# Patient Record
Sex: MALE | Race: WHITE | NOT HISPANIC OR LATINO | Employment: FULL TIME | URBAN - NONMETROPOLITAN AREA
[De-identification: names, ages, dates, MRNs, and addresses within clinical notes are randomized per-mention and may not be internally consistent; named-entity substitution may affect disease eponyms.]

---

## 2017-01-18 ENCOUNTER — OFFICE VISIT (OUTPATIENT)
Dept: FAMILY MEDICINE CLINIC | Facility: CLINIC | Age: 42
End: 2017-01-18

## 2017-01-18 VITALS
OXYGEN SATURATION: 98 % | SYSTOLIC BLOOD PRESSURE: 124 MMHG | HEIGHT: 76 IN | HEART RATE: 103 BPM | WEIGHT: 246.5 LBS | DIASTOLIC BLOOD PRESSURE: 84 MMHG | TEMPERATURE: 97.3 F | BODY MASS INDEX: 30.02 KG/M2

## 2017-01-18 DIAGNOSIS — I10 ESSENTIAL HYPERTENSION: ICD-10-CM

## 2017-01-18 DIAGNOSIS — F90.9 ATTENTION DEFICIT HYPERACTIVITY DISORDER (ADHD), UNSPECIFIED ADHD TYPE: Primary | ICD-10-CM

## 2017-01-18 PROCEDURE — 99203 OFFICE O/P NEW LOW 30 MIN: CPT | Performed by: FAMILY MEDICINE

## 2017-01-18 RX ORDER — DEXTROAMPHETAMINE SACCHARATE, AMPHETAMINE ASPARTATE, DEXTROAMPHETAMINE SULFATE AND AMPHETAMINE SULFATE 5; 5; 5; 5 MG/1; MG/1; MG/1; MG/1
TABLET ORAL
Refills: 0 | COMMUNITY
Start: 2016-11-10 | End: 2017-01-18

## 2017-01-18 RX ORDER — LISINOPRIL AND HYDROCHLOROTHIAZIDE 25; 20 MG/1; MG/1
1 TABLET ORAL DAILY
Qty: 30 TABLET | Refills: 11 | Status: SHIPPED | OUTPATIENT
Start: 2017-01-18 | End: 2017-12-08 | Stop reason: SDUPTHER

## 2017-01-18 RX ORDER — DEXTROAMPHETAMINE SACCHARATE, AMPHETAMINE ASPARTATE, DEXTROAMPHETAMINE SULFATE AND AMPHETAMINE SULFATE 2.5; 2.5; 2.5; 2.5 MG/1; MG/1; MG/1; MG/1
TABLET ORAL
Qty: 60 TABLET | Refills: 0 | Status: SHIPPED | OUTPATIENT
Start: 2017-01-18 | End: 2019-11-26

## 2017-01-18 RX ORDER — LISINOPRIL AND HYDROCHLOROTHIAZIDE 25; 20 MG/1; MG/1
1 TABLET ORAL DAILY
COMMUNITY
End: 2017-01-18 | Stop reason: SDUPTHER

## 2017-01-18 NOTE — PROGRESS NOTES
Subjective   Chief Complaint   Patient presents with   • Establish Care       Alex Miranda is a 41 y.o. male who presents for Establish Care     New to [facility] info:  Previous PCP: doctor in [place]  Patient Care Team:  Emma Esqueda DO as PCP - General (Family Medicine)    Hypertension   This is a chronic problem. The current episode started more than 1 year ago. The problem is controlled. Pertinent negatives include no chest pain, headaches, palpitations or shortness of breath. The current treatment provides significant improvement. There are no compliance problems.      ADHD:  Reports the following symptoms of ADHD: has trouble wrapping up final details of a project, has difficulty getting things in order when there is a task that requires organization, has problems remembering appointments or obligations, avoids getting started with tasks that require a lot of thought, fidgets or squirms with hands or feet when having to sit down for a long time, feels overly active and compelled to do things, makes careless mistakes when having to work on a boring or difficult project, has difficulty staying attentive when doing boring or repetitive work, has difficulty concentrating on what people say even when they are speaking to them directly, misplaces or has difficulty finding things at work or at home, easily distracted by activity or noise around them, leaves their seat in meetings or other situations in which they are expected to remain seated, often feels restless or fidgety, has difficulty unwinding and relaxing , feels that they talk too much in social situations, finishes other peoples' sentences before they can finish them themselves, has difficulty waiting their turn  and often interrupts others when they are busy.  He has been out of medication for about a month because he used to see a doctor in [place] but is no longer seeing them and his Rx .  Was previously on 20 mg bid but hasn't  been on anything for a month.  Being off medication is affecting his job, he is having a hard time focusing at work.    ASRS Symptom checklist score: Part A (inattentive)- 22  Part B (hyperactive) - 44  Age at diagnosis: adolescence  Diagnosis made by: previous physician  Date of last formal assessment: within last 6 months    Current ADHD Meds: Not on anything x 1 month    Adverse side effects noted: none  Patient reports: worsening    Coexisting conditions: none    The following portions of the patient's history were reviewed and updated as appropriate:    Past Medical History   Diagnosis Date   • ADHD (attention deficit hyperactivity disorder)    • Hypertension        Past Surgical History   Procedure Laterality Date   • Amputation         History reviewed. No pertinent family history.    Social History     Social History   • Marital status:      Spouse name: N/A   • Number of children: N/A   • Years of education: N/A     Occupational History   • Not on file.     Social History Main Topics   • Smoking status: Current Every Day Smoker     Packs/day: 1.00   • Smokeless tobacco: Former User   • Alcohol use Yes   • Drug use: Not on file   • Sexual activity: Not on file     Other Topics Concern   • Not on file     Social History Narrative   • No narrative on file       Medications:    Current Outpatient Prescriptions:   •  amphetamine-dextroamphetamine (ADDERALL) 20 MG tablet, TK 1 T PO BID, Disp: , Rfl: 0  •  lisinopril-hydrochlorothiazide (PRINZIDE,ZESTORETIC) 20-25 MG per tablet, Take 1 tablet by mouth Daily., Disp: , Rfl:     Allergies   Allergen Reactions   • Penicillins      Rash       Review of Systems   Constitutional: Negative for fatigue and fever.   Respiratory: Negative for chest tightness and shortness of breath.    Cardiovascular: Negative for chest pain and palpitations.   Gastrointestinal: Negative for nausea and vomiting.   Neurological: Negative for headaches.   Psychiatric/Behavioral:  "Positive for decreased concentration. Negative for agitation, behavioral problems and sleep disturbance. The patient is not nervous/anxious.        Objective   Visit Vitals   • /84   • Pulse 103   • Temp 97.3 °F (36.3 °C)   • Ht 76\" (193 cm)   • Wt 246 lb 8 oz (112 kg)   • SpO2 98%   • BMI 30 kg/m2       Physical Exam   Constitutional: He appears well-developed and well-nourished. No distress.   Cardiovascular: Normal rate, regular rhythm and normal heart sounds.  Exam reveals no gallop and no friction rub.    No murmur heard.  Pulmonary/Chest: Effort normal and breath sounds normal. He has no wheezes. He has no rales.   Neurological: He is alert.   Skin: Skin is warm and dry. He is not diaphoretic.   Psychiatric: He has a normal mood and affect. His behavior is normal.   Nursing note and vitals reviewed.      Assessment/Plan   Alex Miranda is a 41 y.o. male seen today for the followin. Attention deficit hyperactivity disorder (ADHD), unspecified ADHD type  Will restart Adderall at 10 mg daily x 7 days then up to 10 mg bid as he has been off it for a month and essentially starting over. Follow up 1 month to see how he is tolerating it and titrate up as necessary.   We discussed risks of smoking while taking adderall and also possible side effect on blood pressure. He is aware. ALFA reviewed and was appropriate. No evidence of misuse or diversion. Report scanned into chart. Controlled substance contract discussed with patient and signed by myself and patient. Patient is in agreement with treatment plan and is aware that refills will not be done outside of appointments. Patient is aware of potential for addiction and dependence. Contract scanned into chart and copy given to patient. UDS today.     - amphetamine-dextroamphetamine (ADDERALL) 10 MG tablet; Take 1 tab po daily x 7 days then 1 tab po bid  Dispense: 60 tablet; Refill: 0  - TSH  - T4, Free  - Pain Management Profile (13 Drugs) " Urine    2. Essential hypertension  Controlled. Continue current medication. Med refilled.    - Comprehensive Metabolic Panel  - Lipid Panel  - TSH  - T4, Free  - lisinopril-hydrochlorothiazide (PRINZIDE,ZESTORETIC) 20-25 MG per tablet; Take 1 tablet by mouth Daily.  Dispense: 30 tablet; Refill: 11    Follow up: Return in about 4 weeks (around 2/15/2017) for Recheck.          This document has been electronically signed by Emma Esqueda DO on January 18, 2017 10:22 PM

## 2017-01-18 NOTE — MR AVS SNAPSHOT
Alex Miranda   1/18/2017 8:30 AM   Office Visit    Dept Phone:  [phone]   Encounter #:  [id number]    Provider:  Emma Esqueda DO   Department:  [facility]                Your Full Care Plan              Today's Medication Changes          These changes are accurate as of: 1/18/17  9:04 AM.  If you have any questions, ask your nurse or doctor.               New Medication(s)Ordered:     amphetamine-dextroamphetamine 10 MG tablet   Commonly known as:  ADDERALL   Take 1 tab po daily x 7 days then 1 tab po bid   Replaces:  amphetamine-dextroamphetamine 20 MG tablet   Started by:  Emma Esqueda DO         Stop taking medication(s)listed here:     amphetamine-dextroamphetamine 20 MG tablet   Commonly known as:  ADDERALL   Replaced by:  amphetamine-dextroamphetamine 10 MG tablet   Stopped by:  Emma Esqueda DO                Where to Get Your Medications      These medications were sent to [facility] [address] [place] [address] [phone] [facility] [phone] [address] [address]     Phone:  [phone]     lisinopril-hydrochlorothiazide 20-25 MG per tablet         You can get these medications from any pharmacy     Bring a paper prescription for each of these medications     amphetamine-dextroamphetamine 10 MG tablet                  Your Updated Medication List          This list is accurate as of: 1/18/17  9:04 AM.  Always use your most recent med list.                amphetamine-dextroamphetamine 10 MG tablet   Commonly known as:  ADDERALL   Take 1 tab po daily x 7 days then 1 tab po bid       lisinopril-hydrochlorothiazide 20-25 MG per tablet   Commonly known as:  PRINZIDE,ZESTORETIC   Take 1 tablet by mouth Daily.               We Performed the Following     Comprehensive Metabolic Panel     Lipid Panel     Pain Management Profile (13 Drugs) Urine     T4, Free     "TSH       You Were Diagnosed With        Codes Comments    Attention deficit hyperactivity disorder (ADHD), unspecified ADHD type    -  Primary ICD-10-CM: F90.9  ICD-9-CM: 314.01     Essential hypertension     ICD-10-CM: I10  ICD-9-CM: 401.9       Instructions     None    Patient Instructions History      Upcoming Appointments     Visit Type Date Time Department    NEW PATIENT 2017  8:30 AM [facility] 2 [facility]      [facility] Signup     [facility] [employer] allows you to send messages to your doctor, view your test results, renew your prescriptions, schedule appointments, and more. To sign up, go to [employer] and click on the Sign Up Now link in the New User? box. Enter your [employer] Activation Code exactly as it appears below along with the last four digits of your Social Security Number and your Date of Birth () to complete the sign-up process. If you do not sign up before the expiration date, you must request a new code.    [employer] Activation Code: [email or web address]  Expires: 2017  9:04 AM    If you have questions, you can email [email or web address] or call [phone] to talk to our [employer] staff. Remember, [employer] is NOT to be used for urgent needs. For medical emergencies, dial 911.               Other Info from Your Visit           Allergies     Penicillins      Rash      Reason for Visit     Establish Care           Vital Signs     Blood Pressure Pulse Temperature Height Weight Oxygen Saturation    124/84 103 97.3 °F (36.3 °C) 76\" (193 cm) 246 lb 8 oz (112 kg) 98%    Body Mass Index Smoking Status                30 kg/m2 Current Every Day Smoker          Problems and Diagnoses Noted     ADHD (attention deficit hyperactivity disorder)    High blood pressure        "

## 2017-01-22 LAB
6MAM UR QL: NOT DETECTED
7AMINOCLONAZEPAM UR QL: NOT DETECTED
A-OH ALPRAZ UR QL: NOT DETECTED
ALPRAZ UR QL: NOT DETECTED
AMPHET UR QL SCN: NOT DETECTED
BARBITURATES UR QL: NOT DETECTED
BUPRENORPHINE UR QL: NOT DETECTED
BZE UR QL: NOT DETECTED
CARBOXYTHC UR QL: NOT DETECTED
CARISOPRODOL UR QL: NOT DETECTED
CLONAZEPAM UR QL: NOT DETECTED
CODEINE UR QL: NOT DETECTED
CREAT UR-MCNC: 161.5 MG/DL (ref 20–400)
DIAZEPAM UR QL: NOT DETECTED
ETHYL GLUCURONIDE UR QL: PRESENT
FENTANYL UR QL: NOT DETECTED
HYDROCODONE UR QL: NOT DETECTED
HYDROMORPHONE UR QL: NOT DETECTED
LORAZEPAM UR QL: NOT DETECTED
MDA UR QL: NOT DETECTED
MDEA UR QL: NOT DETECTED
MDMA UR QL: NOT DETECTED
MEPERIDINE UR QL: NOT DETECTED
METHADONE UR QL: NOT DETECTED
METHAMPHET UR QL: NOT DETECTED
MIDAZOLAM UR QL SCN: NOT DETECTED
MORPHINE UR QL: NOT DETECTED
NORBUPRENORPHINE UR QL CFM: NOT DETECTED
NORDIAZEPAM UR QL: NOT DETECTED
NORFENTANYL UR QL: NOT DETECTED
NORHYDROCODONE UR QL CFM: NOT DETECTED
NOROXYCODONE UR QL CFM: NOT DETECTED
NOROXYMORPHONE: NOT DETECTED
OXAZEPAM UR QL: NOT DETECTED
OXYCODONE UR QL: NOT DETECTED
OXYMORPHONE UR QL: NOT DETECTED
PATHOLOGY STUDY: NORMAL
PCP UR QL: NOT DETECTED
PHENTERMINE UR QL: NOT DETECTED
PPAA UR QL: NOT DETECTED
PROPOXYPH UR QL: NOT DETECTED
SERVICE CMNT-IMP: NORMAL
TAPENTADOL UR QL SCN: NOT DETECTED
TAPENTADOL-O-SULF: NOT DETECTED
TEMAZEPAM UR QL: NOT DETECTED
TRAMADOL UR QL: NOT DETECTED
ZOLPIDEM UR QL: NOT DETECTED

## 2017-10-27 ENCOUNTER — CONSULT (OUTPATIENT)
Dept: ONCOLOGY | Facility: CLINIC | Age: 42
End: 2017-10-27

## 2017-10-27 ENCOUNTER — LAB (OUTPATIENT)
Dept: LAB | Facility: HOSPITAL | Age: 42
End: 2017-10-27

## 2017-10-27 ENCOUNTER — LAB (OUTPATIENT)
Dept: ONCOLOGY | Facility: HOSPITAL | Age: 42
End: 2017-10-27

## 2017-10-27 VITALS
HEIGHT: 76 IN | SYSTOLIC BLOOD PRESSURE: 166 MMHG | WEIGHT: 240.08 LBS | TEMPERATURE: 98.8 F | BODY MASS INDEX: 29.24 KG/M2 | HEART RATE: 108 BPM | RESPIRATION RATE: 18 BRPM | DIASTOLIC BLOOD PRESSURE: 113 MMHG

## 2017-10-27 DIAGNOSIS — D75.1 ERYTHROCYTOSIS: ICD-10-CM

## 2017-10-27 DIAGNOSIS — D75.1 ERYTHROCYTOSIS: Primary | ICD-10-CM

## 2017-10-27 DIAGNOSIS — R06.83 SNORING: ICD-10-CM

## 2017-10-27 LAB
ALBUMIN SERPL-MCNC: 4.3 G/DL (ref 3.4–4.8)
ALBUMIN/GLOB SERPL: 1.2 G/DL (ref 1.1–1.8)
ALP SERPL-CCNC: 58 U/L (ref 38–126)
ALT SERPL W P-5'-P-CCNC: 61 U/L (ref 21–72)
ANION GAP SERPL CALCULATED.3IONS-SCNC: 13 MMOL/L (ref 5–15)
AST SERPL-CCNC: 42 U/L (ref 17–59)
BASOPHILS # BLD AUTO: 0.04 10*3/MM3 (ref 0–0.2)
BASOPHILS NFR BLD AUTO: 0.5 % (ref 0–2)
BILIRUB SERPL-MCNC: 0.7 MG/DL (ref 0.2–1.3)
BUN BLD-MCNC: 8 MG/DL (ref 7–21)
BUN/CREAT SERPL: 7 (ref 7–25)
CALCIUM SPEC-SCNC: 9 MG/DL (ref 8.4–10.2)
CHLORIDE SERPL-SCNC: 104 MMOL/L (ref 95–110)
CO2 SERPL-SCNC: 26 MMOL/L (ref 22–31)
CREAT BLD-MCNC: 1.15 MG/DL (ref 0.7–1.3)
DEPRECATED RDW RBC AUTO: 41.3 FL (ref 35.1–43.9)
EOSINOPHIL # BLD AUTO: 0.34 10*3/MM3 (ref 0–0.7)
EOSINOPHIL NFR BLD AUTO: 4.3 % (ref 0–7)
ERYTHROCYTE [DISTWIDTH] IN BLOOD BY AUTOMATED COUNT: 14.4 % (ref 11.5–14.5)
FERRITIN SERPL-MCNC: 42.5 NG/ML (ref 17.9–464)
GFR SERPL CREATININE-BSD FRML MDRD: 70 ML/MIN/1.73 (ref 60–147)
GLOBULIN UR ELPH-MCNC: 3.6 GM/DL (ref 2.3–3.5)
GLUCOSE BLD-MCNC: 90 MG/DL (ref 60–100)
HCT VFR BLD AUTO: 54.5 % (ref 39–49)
HGB BLD-MCNC: 19 G/DL (ref 13.7–17.3)
IMM GRANULOCYTES # BLD: 0.02 10*3/MM3 (ref 0–0.02)
IMM GRANULOCYTES NFR BLD: 0.3 % (ref 0–0.5)
IRON 24H UR-MRATE: 35 MCG/DL (ref 49–181)
IRON SATN MFR SERPL: 8 % (ref 20–55)
LDH SERPL-CCNC: 393 U/L (ref 313–618)
LYMPHOCYTES # BLD AUTO: 2.7 10*3/MM3 (ref 0.6–4.2)
LYMPHOCYTES NFR BLD AUTO: 34.5 % (ref 10–50)
MCH RBC QN AUTO: 27.7 PG (ref 26.5–34)
MCHC RBC AUTO-ENTMCNC: 34.9 G/DL (ref 31.5–36.3)
MCV RBC AUTO: 79.6 FL (ref 80–98)
MONOCYTES # BLD AUTO: 0.91 10*3/MM3 (ref 0–0.9)
MONOCYTES NFR BLD AUTO: 11.6 % (ref 0–12)
NEUTROPHILS # BLD AUTO: 3.82 10*3/MM3 (ref 2–8.6)
NEUTROPHILS NFR BLD AUTO: 48.8 % (ref 37–80)
PLATELET # BLD AUTO: 198 10*3/MM3 (ref 150–450)
PMV BLD AUTO: 10.7 FL (ref 8–12)
POST-BLOOD PRESSURE: NORMAL
POTASSIUM BLD-SCNC: 3.9 MMOL/L (ref 3.5–5.1)
PRE-BLOOD PRESSURE: NORMAL
PRE-HCT: 54.5
PRE-HGB: 19
PROT SERPL-MCNC: 7.9 G/DL (ref 6.3–8.6)
PULSE: 84
RBC # BLD AUTO: 6.85 10*6/MM3 (ref 4.37–5.74)
SODIUM BLD-SCNC: 143 MMOL/L (ref 137–145)
TIBC SERPL-MCNC: 414 MCG/DL (ref 261–462)
VOLUME COLLECTED: 500
WBC NRBC COR # BLD: 7.83 10*3/MM3 (ref 3.2–9.8)

## 2017-10-27 PROCEDURE — G0463 HOSPITAL OUTPT CLINIC VISIT: HCPCS | Performed by: INTERNAL MEDICINE

## 2017-10-27 PROCEDURE — 81270 JAK2 GENE: CPT | Performed by: INTERNAL MEDICINE

## 2017-10-27 PROCEDURE — 99204 OFFICE O/P NEW MOD 45 MIN: CPT | Performed by: INTERNAL MEDICINE

## 2017-10-27 PROCEDURE — 99406 BEHAV CHNG SMOKING 3-10 MIN: CPT | Performed by: INTERNAL MEDICINE

## 2017-10-27 PROCEDURE — 83615 LACTATE (LD) (LDH) ENZYME: CPT | Performed by: INTERNAL MEDICINE

## 2017-10-27 PROCEDURE — 83550 IRON BINDING TEST: CPT | Performed by: INTERNAL MEDICINE

## 2017-10-27 PROCEDURE — 99195 PHLEBOTOMY: CPT | Performed by: INTERNAL MEDICINE

## 2017-10-27 PROCEDURE — 83540 ASSAY OF IRON: CPT | Performed by: INTERNAL MEDICINE

## 2017-10-27 PROCEDURE — 80053 COMPREHEN METABOLIC PANEL: CPT | Performed by: INTERNAL MEDICINE

## 2017-10-27 PROCEDURE — 85025 COMPLETE CBC W/AUTO DIFF WBC: CPT | Performed by: INTERNAL MEDICINE

## 2017-10-27 PROCEDURE — 82668 ASSAY OF ERYTHROPOIETIN: CPT | Performed by: INTERNAL MEDICINE

## 2017-10-27 PROCEDURE — 82728 ASSAY OF FERRITIN: CPT | Performed by: INTERNAL MEDICINE

## 2017-10-27 NOTE — PROGRESS NOTES
DATE OF CONSULT: 10/27/2017    REQUESTING SOURCE:   Emma Esqueda DO      REASON FOR CONSULTATION: Erythrocytosis      HISTORY OF PRESENT ILLNESS:    42-year-old male with a past medical history significant for hypertension, attention deficit hyperactivity disorder, history of nicotine addiction currently smoking about a pack to pack and half per day, history of snoring at night and history of testosterone injection for body building which she was using until September 2017 was found to have worsening erythrocytosis on a blood work done on September 23, 2017.  At that point patient's hemoglobin was found to be extremely elevated at 19.8.  Patient has been referred to [facility] for further evaluation and recommendation regarding his erythrocytosis.  Patient denies any headache or dizziness or any tingling numbness affecting upper extremity.  Denies any symptoms suggestive of erythromelalgia.  Denies any history of blood clot.  Denies any blood in the stool or urine.  States he he knows that he snores chronically but has never got sleep study done to rule out obstructive sleep apnea.      PAST MEDICAL HISTORY:    Past Medical History:   Diagnosis Date   • ADHD (attention deficit hyperactivity disorder)    • Hypertension        PAST SURGICAL HISTORY:  Past Surgical History:   Procedure Laterality Date   • AMPUTATION         ALLERGIES:    Allergies   Allergen Reactions   • Penicillins      Rash       SOCIAL HISTORY:   Social History   Substance Use Topics   • Smoking status: Current Every Day Smoker     Packs/day: 1.00   • Smokeless tobacco: Former User   • Alcohol use Yes       CURRENT MEDICATIONS:    Current Outpatient Prescriptions   Medication Sig Dispense Refill   • amphetamine-dextroamphetamine (ADDERALL) 10 MG tablet Take 1 tab po daily x 7 days then 1 tab po bid 60 tablet 0   • lisinopril-hydrochlorothiazide (PRINZIDE,ZESTORETIC) 20-25 MG per tablet Take 1 tablet by mouth Daily. 30 tablet 11     No  current facility-administered medications for this visit.         HOME MEDICATIONS:   Current Outpatient Prescriptions on File Prior to Visit   Medication Sig Dispense Refill   • amphetamine-dextroamphetamine (ADDERALL) 10 MG tablet Take 1 tab po daily x 7 days then 1 tab po bid 60 tablet 0   • lisinopril-hydrochlorothiazide (PRINZIDE,ZESTORETIC) 20-25 MG per tablet Take 1 tablet by mouth Daily. 30 tablet 11     No current facility-administered medications on file prior to visit.        FAMILY HISTORY:    Denies any family history of cancer.  Both mother and father have obstructive sleep apnea.          REVIEW OF SYSTEMS:      CONSTITUTIONAL:  Complains of fatigue. Denies any fever, chills or weight loss.     HEENT:  No epistaxis, mouth sores or difficulty swallowing.    RESPIRATORY:  No new shortness of breath. No new cough or hemoptysis.    CARDIOVASCULAR:  No chest pain or palpitations.    GASTROINTESTINAL:  No abdominal pain nausea, vomiting or blood in the stool.    GENITOURINARY: No Dysuria or Hematuria.    MUSCULOSKELETAL:  No new back pain or arthralgia..    LYMPHATICS:  Denies any abnormal swollen glands anywhere in the body.    NEUROLOGICAL : No tingling or numbness. No headache or dizziness. No seizures or balance problems.    SKIN: No new skin lesions.        PHYSICAL EXAMINATION:      VITAL SIGNS:  Temp:  [98.8 °F (37.1 °C)] 98.8 °F (37.1 °C)  Heart Rate:  [108] 108  Resp:  [18] 18  BP: (166)/(113) 166/113    GENERAL:  Not in any distress.    HEENT:  Normocephalic, Atraumatic.Eyes  Shows mild pallor. No icterus. Extraocular Movements Intact. No Facial Asymmetry noted.    NECK:  No adenopathy. NO JVD.    RESPIRATORY:  Fair air entry bilateral. No rhonchi or wheezing.    CARDIOVASCULAR:  S1, S2. Regular rate and rhythm. No murmur or gallop appreciated.    ABDOMEN:  Soft, obese, nontender. Bowel sounds present in all four quadrants.  No organomegaly appreciated.    EXTREMITIES:  No edema.No Calf  Tenderness.    NEUROLOGIC:  Alert, awake and oriented ×3.  No  Motor or sensory deficit appreciated. Cranial Nerves 2-12 grossly intact.          DIAGNOSTIC DATA:    CBC Auto Differential      Ref Range & Units 1:51 PM     WBC 3.20 - 9.80 10*3/mm3 7.83   RBC 4.37 - 5.74 10*6/mm3 6.85 (H)   Hemoglobin 13.7 - 17.3 g/dL 19.0 (H)   Hematocrit 39.0 - 49.0 % 54.5 (H)   MCV 80.0 - 98.0 fL 79.6 (L)   MCH 26.5 - 34.0 pg 27.7   MCHC 31.5 - 36.3 g/dL 34.9   RDW 11.5 - 14.5 % 14.4   RDW-SD 35.1 - 43.9 fl 41.3   MPV 8.0 - 12.0 fL 10.7   Platelets 150 - 450 10*3/mm3 198   Neutrophil % 37.0 - 80.0 % 48.8   Lymphocyte % 10.0 - 50.0 % 34.5   Monocyte % 0.0 - 12.0 % 11.6   Eosinophil % 0.0 - 7.0 % 4.3   Basophil % 0.0 - 2.0 % 0.5   Immature Grans % 0.0 - 0.5 % 0.3   Neutrophils, Absolute 2.00 - 8.60 10*3/mm3 3.82   Lymphocytes, Absolute 0.60 - 4.20 10*3/mm3 2.70   Monocytes, Absolute 0.00 - 0.90 10*3/mm3 0.91 (H)   Eosinophils, Absolute 0.00 - 0.70 10*3/mm3 0.34   Basophils, Absolute 0.00 - 0.20 10*3/mm3 0.04   Immature Grans, Absolute 0.00 - 0.02 10*3/mm3 0.02               CBC done on September 23, 2017 at [facility] showed: White blood cell 11.2, hemoglobin 19.8, MCV 86, platelet count 218,000, absolute neutrophil count mildly elevated at 7.4    CBC done on June 20, 2017 at [facility] showed: White blood cell is 10.3, hemoglobin 18.1, hematocrit 55, MCV 82, platelet count is 187,000    CBC done on December 29, 2015 at [facility] showed: White blood cell is 6.3, hemoglobin 16.9, MCV 83, platelet count is 228,000.            ASSESSMENT AND PLAN:      1.  Erythrocytosis: Differential diagnosis at this point include secondary erythrocytosis from extensive smoking, obstructive sleep apnea, testosterone injection versus primary bone marrow problem like myeloproliferative neoplasm.  Since patient's hemoglobin was more than 19 last month.  We will repeat blood work today with CBC, CMP, LDH, iron studies, ferritin, JAK2  mutation and erythropoietin to differentiate between primary and secondary erythrocytosis. Since hemoglobin is elevated at 19 today, we will get therapeutic phlebotomy with 500 mL of blood to be removed today.  Patient was explained about how smoking and testosterone can contribute to erythrocytosis.  Patient states he has already stopped using testosterone since last month when he found out his hemoglobin was very high.  He currently smokes about pack and half every day.  He was counseled about smoking cessation.  Since he provides history consistent with sleep apnea will also refer him to sleep medicine to get sleep study done.  Patient was instructed to start taking baby aspirin every day to prevent any blood clot from elevated hemoglobin.    2.  History of hypertension: Patient's blood pressure is elevated at 1 63/113.  Patient states he has not been taking his blood pressure for last 1 week.  Denies any headache or dizziness or shortness of breath.  He was counseled about importance of taking blood pressure medication regularly.     3.  History of attention deficit hyperactivity disorder    4.  Health maintenance: Unfortunately patient smokes about pack and half per day, he was counseled about smoking cessation.  About 4 minutes were spent for smoking cessation counseling today.  His only 42 years of age and not due for colonoscopy at this point.          Thank you for this consultation.          Yasir Hardin MD  10/27/2017  1:44 PM          EM[employer] Dragon/Transcription disclaimer:   Much of this encounter note is an electronic transcription/translation of spoken language to printed text. The electronic translation of spoken language may permit erroneous, or at times, nonsensical words or phrases to be inadvertently transcribed; Although I have reviewed the note for such errors, some may still exist.

## 2017-10-27 NOTE — PATIENT INSTRUCTIONS
Therapeutic Phlebotomy  Therapeutic phlebotomy is the controlled removal of blood from a person's body for the purpose of treating a medical condition. The procedure is similar to donating blood. Usually, about a pint (470 mL, or 0.47L) of blood is removed. The average adult has 9-12 pints (4.3-5.7 L) of blood.  Therapeutic phlebotomy may be used to treat the following medical conditions:  · Hemochromatosis. This is a condition in which the blood contains too much iron.  · Polycythemia vera. This is a condition in which the blood contains too many red blood cells.  · Porphyria cutanea tarda. This is a disease in which an important part of hemoglobin is not made properly. It results in the buildup of abnormal amounts of porphyrins in the body.  · Sickle cell disease. This is a condition in which the red blood cells form an abnormal crescent shape rather than a round shape.  LET YOUR HEALTH CARE PROVIDER KNOW ABOUT:  · Any allergies you have.  · All medicines you are taking, including vitamins, herbs, eye drops, creams, and over-the-counter medicines.  · Previous problems you or members of your family have had with the use of anesthetics.  · Any blood disorders you have.  · Previous surgeries you have had.  · Any medical conditions you may have.  RISKS AND COMPLICATIONS  Generally, this is a safe procedure. However, problems may occur, including:  · Nausea or light-headedness.  · Low blood pressure.  · Soreness, bleeding, swelling, or bruising at the needle insertion site.  · Infection.  BEFORE THE PROCEDURE  · Follow instructions from your health care provider about eating or drinking restrictions.  · Ask your health care provider about changing or stopping your regular medicines. This is especially important if you are taking diabetes medicines or blood thinners.  · Wear clothing with sleeves that can be raised above the elbow.  · Plan to have someone take you home after the procedure.  · You may have a blood sample  taken.  PROCEDURE  · A needle will be inserted into one of your veins.  · Tubing and a collection bag will be attached to that needle.  · Blood will flow through the needle and tubing into the collection bag.  · You may be asked to open and close your hand slowly and continually during the entire collection.  · After the specified amount of blood has been removed from your body, the collection bag and tubing will be clamped.  · The needle will be removed from your vein.  · Pressure will be held on the site of the needle insertion to stop the bleeding.  · A bandage (dressing) will be placed over the needle insertion site.  The procedure may vary among health care providers and hospitals.  AFTER THE PROCEDURE  · Your recovery will be assessed and monitored.  · You can return to your normal activities as directed by your health care provider.     This information is not intended to replace advice given to you by your health care provider. Make sure you discuss any questions you have with your health care provider.     Document Released: 05/21/2012 Document Revised: 05/03/2016 Document Reviewed: 12/14/2015  [employer] Interactive Patient Education ©2017 [employer]  Therapeutic Phlebotomy, Care After  Refer to this sheet in the next few weeks. These instructions provide you with information about caring for yourself after your procedure. Your health care provider may also give you more specific instructions. Your treatment has been planned according to current medical practices, but problems sometimes occur. Call your health care provider if you have any problems or questions after your procedure.  WHAT TO EXPECT AFTER THE PROCEDURE  After your procedure, it is common to have:  · Light-headedness or dizziness. You may feel faint.  · Nausea.  · Tiredness.  HOME CARE INSTRUCTIONS  Activities  · Return to your normal activities as directed by your health care provider. Most people can go back to their normal activities  right away.  · Avoid strenuous physical activity and heavy lifting or pulling for about 5 hours after the procedure. Do not lift anything that is heavier than 10 lb (4.5 kg).  · Athletes should avoid strenuous exercise for at least 12 hours.  · Change positions slowly for the remainder of the day. This will help to prevent light-headedness or fainting.  · If you feel light-headed, lie down until the feeling goes away.  Eating and Drinking  · Be sure to eat well-balanced meals for the next 24 hours.  · Drink enough fluid to keep your urine clear or pale yellow.  · Avoid drinking alcohol on the day that you had the procedure.  Care of the Needle Insertion Site  · Keep your bandage dry. You can remove the bandage after about 5 hours or as directed by your health care provider.  · If you have bleeding from the needle insertion site, elevate your arm and press firmly on the site until the bleeding stops.  · If you have bruising at the site, apply ice to the area:    Put ice in a plastic bag.    Place a towel between your skin and the bag.    Leave the ice on for 20 minutes, 2-3 times a day for the first 24 hours.  · If the swelling does not go away after 24 hours, apply a warm, moist washcloth to the area for 20 minutes, 2-3 times a day.  General Instructions  · Avoid smoking for at least 30 minutes after the procedure.  · Keep all follow-up visits as directed by your health care provider. It is important to continue with further therapeutic phlebotomy treatments as directed.  SEEK MEDICAL CARE IF:  · You have redness, swelling, or pain at the needle insertion site.  · You have fluid, blood, or pus coming from the needle insertion site.  · You feel light-headed, dizzy, or nauseated, and the feeling does not go away.  · You notice new bruising at the needle insertion site.  · You feel weaker than normal.  · You have a fever or chills.  SEEK IMMEDIATE MEDICAL CARE IF:  · You have severe nausea or vomiting.  · You have  chest pain.  · You have trouble breathing.     This information is not intended to replace advice given to you by your health care provider. Make sure you discuss any questions you have with your health care provider.     Document Released: 05/21/2012 Document Revised: 05/03/2016 Document Reviewed: 12/14/2015  [employer] Interactive Patient Education ©2017 [employer]

## 2017-10-30 LAB — ETHNIC BACKGROUND STATED: 9.4 MIU/ML (ref 2.6–18.5)

## 2017-11-01 LAB
JAK2 P.V617F BLD/T QL: NORMAL
LAB DIRECTOR NAME PROVIDER: NORMAL
LABORATORY COMMENT REPORT: NORMAL

## 2017-11-10 ENCOUNTER — OFFICE VISIT (OUTPATIENT)
Dept: ONCOLOGY | Facility: CLINIC | Age: 42
End: 2017-11-10

## 2017-11-10 ENCOUNTER — LAB (OUTPATIENT)
Dept: ONCOLOGY | Facility: HOSPITAL | Age: 42
End: 2017-11-10

## 2017-11-10 ENCOUNTER — APPOINTMENT (OUTPATIENT)
Dept: LAB | Facility: HOSPITAL | Age: 42
End: 2017-11-10

## 2017-11-10 VITALS
HEART RATE: 96 BPM | WEIGHT: 237.44 LBS | BODY MASS INDEX: 28.91 KG/M2 | RESPIRATION RATE: 16 BRPM | TEMPERATURE: 98.1 F | HEIGHT: 76 IN | SYSTOLIC BLOOD PRESSURE: 121 MMHG | DIASTOLIC BLOOD PRESSURE: 97 MMHG

## 2017-11-10 DIAGNOSIS — D75.1 ERYTHROCYTOSIS: Primary | ICD-10-CM

## 2017-11-10 DIAGNOSIS — R06.83 SNORING: ICD-10-CM

## 2017-11-10 LAB
BASOPHILS # BLD AUTO: 0.05 10*3/MM3 (ref 0–0.2)
BASOPHILS NFR BLD AUTO: 0.6 % (ref 0–2)
DEPRECATED RDW RBC AUTO: 40.3 FL (ref 35.1–43.9)
EOSINOPHIL # BLD AUTO: 0.38 10*3/MM3 (ref 0–0.7)
EOSINOPHIL NFR BLD AUTO: 4.3 % (ref 0–7)
ERYTHROCYTE [DISTWIDTH] IN BLOOD BY AUTOMATED COUNT: 14 % (ref 11.5–14.5)
HCT VFR BLD AUTO: 53.6 % (ref 39–49)
HGB BLD-MCNC: 18.5 G/DL (ref 13.7–17.3)
IMM GRANULOCYTES # BLD: 0.03 10*3/MM3 (ref 0–0.02)
IMM GRANULOCYTES NFR BLD: 0.3 % (ref 0–0.5)
LYMPHOCYTES # BLD AUTO: 2.8 10*3/MM3 (ref 0.6–4.2)
LYMPHOCYTES NFR BLD AUTO: 31.7 % (ref 10–50)
MCH RBC QN AUTO: 27.4 PG (ref 26.5–34)
MCHC RBC AUTO-ENTMCNC: 34.5 G/DL (ref 31.5–36.3)
MCV RBC AUTO: 79.5 FL (ref 80–98)
MONOCYTES # BLD AUTO: 0.93 10*3/MM3 (ref 0–0.9)
MONOCYTES NFR BLD AUTO: 10.5 % (ref 0–12)
NEUTROPHILS # BLD AUTO: 4.65 10*3/MM3 (ref 2–8.6)
NEUTROPHILS NFR BLD AUTO: 52.6 % (ref 37–80)
PLATELET # BLD AUTO: 189 10*3/MM3 (ref 150–450)
PMV BLD AUTO: 10.7 FL (ref 8–12)
POST-BLOOD PRESSURE: NORMAL
PRE-BLOOD PRESSURE: NORMAL
PRE-HCT: 53.6
PRE-HGB: 18.5
PULSE: 96
RBC # BLD AUTO: 6.74 10*6/MM3 (ref 4.37–5.74)
VOLUME COLLECTED: 500
WBC NRBC COR # BLD: 8.84 10*3/MM3 (ref 3.2–9.8)

## 2017-11-10 PROCEDURE — G0463 HOSPITAL OUTPT CLINIC VISIT: HCPCS | Performed by: INTERNAL MEDICINE

## 2017-11-10 PROCEDURE — 99195 PHLEBOTOMY: CPT | Performed by: INTERNAL MEDICINE

## 2017-11-10 PROCEDURE — 99214 OFFICE O/P EST MOD 30 MIN: CPT | Performed by: INTERNAL MEDICINE

## 2017-11-10 PROCEDURE — 85025 COMPLETE CBC W/AUTO DIFF WBC: CPT

## 2017-11-10 PROCEDURE — 36415 COLL VENOUS BLD VENIPUNCTURE: CPT | Performed by: INTERNAL MEDICINE

## 2017-11-10 NOTE — PROGRESS NOTES
DATE OF VISIT: 11/10/2017    REASON FOR VISIT:  Secondary erythrocytosis    HISTORY OF PRESENT ILLNESS:    42-year-old male with a past medical history significant for hypertension, attention deficit hyperactivity disorder, history of nicotine addiction currently smoking about a pack to pack and half per day, history of snoring at night and history of testosterone injection for body building was seen in consultation on October 27, 2017 for evaluation of erythrocytosis.  Patient had blood work done for evaluation and had a therapeutic phlebotomy on that day for a hemoglobin of 19.  Patient is here for follow-up visit today.  Denies any headache or dizziness.  Denies any abnormal swollen and anywhere in the body.    PAST MEDICAL HISTORY:    Past Medical History:   Diagnosis Date   • ADHD (attention deficit hyperactivity disorder)    • Hypertension        SOCIAL HISTORY:    Social History   Substance Use Topics   • Smoking status: Current Every Day Smoker     Packs/day: 1.00   • Smokeless tobacco: Former User   • Alcohol use Yes       Surgical History :  Past Surgical History:   Procedure Laterality Date   • AMPUTATION         ALLERGIES:    Allergies   Allergen Reactions   • Penicillins      Rash         FAMILY HISTORY:  Denies any family history of cancer.  Both mother and father have obstructive sleep apnea.        REVIEW OF SYSTEMS:      CONSTITUTIONAL: Positive for fatigue.  No fever, chills, or night sweats.     HEENT:  No epistaxis, mouth sores, or difficulty swallowing.    RESPIRATORY:  No new shortness of breath or cough at present.    CARDIOVASCULAR:  No chest pain or palpitations.    GASTROINTESTINAL:  No abdominal pain, nausea, vomiting, or blood in the stool.    GENITOURINARY:  No dysuria or hematuria.    MUSCULOSKELETAL:  No any new back pain or arthralgias.     NEUROLOGICAL:  No tingling or numbness. No new headache or dizziness.     LYMPHATICS:  Denies any abnormal swollen and anywhere in the  "body.    SKIN:  Denies any new skin rash.        PHYSICAL EXAMINATION:      VITAL SIGNS:  /97  Pulse 96  Temp 98.1 °F (36.7 °C) (Temporal Artery )   Resp 16  Ht 75.98\" (193 cm)  Wt 237 lb 7 oz (108 kg)  BMI 28.91 kg/m2  Last 3 weights    11/10/17  1422   Weight: 237 lb 7 oz (108 kg)       ECOG  performance status: 0      GENERAL:  Not in any distress.    HEENT:  Normocephalic, Atraumatic.Mild Conjunctival pallor. No icterus. Extraocular Movements Intact. No Facial Asymmetry noted.    NECK:  No adenopathy. No JVD.    RESPIRATORY:  Fair air entry bilateral. No rhonchi or wheezing.    CARDIOVASCULAR:  S1, S2. Regular rate and rhythm. No murmur or gallop appreciated.    ABDOMEN:  Soft, obese, nontender. Bowel sounds present in all four quadrants.  No organomegaly appreciated.    EXTREMITIES:  No edema.No Calf Tenderness.    NEUROLOGIC:  Alert, awake and oriented ×3.  No  Motor or sensory deficit appreciated. Cranial Nerves 2-12 grossly intact.            DIAGNOSTIC DATA:    Glucose   Date Value Ref Range Status   10/27/2017 90 60 - 100 mg/dL Final     Sodium   Date Value Ref Range Status   10/27/2017 143 137 - 145 mmol/L Final     Potassium   Date Value Ref Range Status   10/27/2017 3.9 3.5 - 5.1 mmol/L Final     CO2   Date Value Ref Range Status   10/27/2017 26.0 22.0 - 31.0 mmol/L Final     Chloride   Date Value Ref Range Status   10/27/2017 104 95 - 110 mmol/L Final     Anion Gap   Date Value Ref Range Status   10/27/2017 13.0 5.0 - 15.0 mmol/L Final     Creatinine   Date Value Ref Range Status   10/27/2017 1.15 0.70 - 1.30 mg/dL Final     BUN   Date Value Ref Range Status   10/27/2017 8 7 - 21 mg/dL Final     BUN/Creatinine Ratio   Date Value Ref Range Status   10/27/2017 7.0 7.0 - 25.0 Final     Calcium   Date Value Ref Range Status   10/27/2017 9.0 8.4 - 10.2 mg/dL Final     eGFR Non  Amer   Date Value Ref Range Status   10/27/2017 70 >60 mL/min/1.73 Final     Alkaline Phosphatase   Date Value " Ref Range Status   10/27/2017 58 38 - 126 U/L Final     Total Protein   Date Value Ref Range Status   10/27/2017 7.9 6.3 - 8.6 g/dL Final     ALT (SGPT)   Date Value Ref Range Status   10/27/2017 61 21 - 72 U/L Final     AST (SGOT)   Date Value Ref Range Status   10/27/2017 42 17 - 59 U/L Final     Total Bilirubin   Date Value Ref Range Status   10/27/2017 0.7 0.2 - 1.3 mg/dL Final     Albumin   Date Value Ref Range Status   10/27/2017 4.30 3.40 - 4.80 g/dL Final     Globulin   Date Value Ref Range Status   10/27/2017 3.6 (H) 2.3 - 3.5 gm/dL Final     A/G Ratio   Date Value Ref Range Status   10/27/2017 1.2 1.1 - 1.8 g/dL Final     Lab Results   Component Value Date    WBC 8.84 11/10/2017    HGB 18.5 (H) 11/10/2017    HCT 53.6 (H) 11/10/2017    MCV 79.5 (L) 11/10/2017     11/10/2017     Lab Results   Component Value Date    NEUTROABS 4.65 11/10/2017    IRON 35 (L) 10/27/2017    TIBC 414 10/27/2017    LABIRON 8 (L) 10/27/2017    FERRITIN 42.50 10/27/2017     Erythropoietin      Ref Range & Units 2wk ago     Erythropoietin 2.6 - 18.5 mIU/mL 9.4   Comments: Siemens Immulite 2000 Immunochemiluminometric assay (ICMA)   Resulting Agency  [facility]   Narrative   Performed at:   - [address]  [id number]  : Rony Patricio PhD, Phone:  [phone]      Specimen Collected: 10/27/17  1:51 PM Last Resulted: 10/30/17  1:10 PM                JAK2 mutation testing on peripheral blood done on October 27, 2017 showed:  JAK2 V617F Mutation Comment   Comments: Result: NEGATIVE for the JAK2 V617F mutation.                   ASSESSMENT AND PLAN:      1.  Secondary erythrocytosis: Most likely secondary to smoking plus or minus history of testosterone supplement, last use of testosterone was in September 2017, patient also provides history of snoring at night suggestive of obstructive sleep apnea.  Patient is awaiting a sleep study that is scheduled for January 2018 for now.  CBC done  earlier today shows hemoglobin is still 18.5 with hematocrit of 53, we will schedule another therapeutic phlebotomy with 500 mL of blood to be removed today.  His JAK2 mutation is negative and erythropoietin is in normal range consistent with a secondary erythrocytosis .  Result of blood work were discussed with patient.  Patient was encouraged to keep cutting back on his smoking.  We'll see him back in about 4 weeks with a repeat CBC on that day.    2.  Hypertension: He started taking his antihypertensive medications blood pressure is better control as compared to last clinic visit    3.  Health maintenance: Unfortunately patient continues to smoke about a pack per day, he was counseled about smoking cessation.  About 3 minutes were spent for smoking cessation counseling.  His only 42 years of age and not due for colonoscopy at this point.  Remains full code.     Yasir Hardin MD  11/10/2017  2:35 PM        EMR Dragon/Transcription disclaimer:   Much of this encounter note is an electronic transcription/translation of spoken language to printed text. The electronic translation of spoken language may permit erroneous, or at times, nonsensical words or phrases to be inadvertently transcribed; Although I have reviewed the note for such errors, some may still exist.

## 2017-12-08 ENCOUNTER — LAB (OUTPATIENT)
Dept: ONCOLOGY | Facility: HOSPITAL | Age: 42
End: 2017-12-08

## 2017-12-08 ENCOUNTER — OFFICE VISIT (OUTPATIENT)
Dept: ONCOLOGY | Facility: CLINIC | Age: 42
End: 2017-12-08

## 2017-12-08 VITALS
SYSTOLIC BLOOD PRESSURE: 119 MMHG | RESPIRATION RATE: 18 BRPM | BODY MASS INDEX: 28.43 KG/M2 | TEMPERATURE: 99.1 F | HEIGHT: 76 IN | DIASTOLIC BLOOD PRESSURE: 79 MMHG | HEART RATE: 89 BPM | WEIGHT: 233.47 LBS

## 2017-12-08 DIAGNOSIS — D75.1 ERYTHROCYTOSIS: ICD-10-CM

## 2017-12-08 DIAGNOSIS — D75.1 ERYTHROCYTOSIS: Primary | ICD-10-CM

## 2017-12-08 LAB
BASOPHILS # BLD AUTO: 0.07 10*3/MM3 (ref 0–0.2)
BASOPHILS NFR BLD AUTO: 0.8 % (ref 0–2)
DEPRECATED RDW RBC AUTO: 37.6 FL (ref 35.1–43.9)
EOSINOPHIL # BLD AUTO: 0.37 10*3/MM3 (ref 0–0.7)
EOSINOPHIL NFR BLD AUTO: 4.4 % (ref 0–7)
ERYTHROCYTE [DISTWIDTH] IN BLOOD BY AUTOMATED COUNT: 13.4 % (ref 11.5–14.5)
HCT VFR BLD AUTO: 46.4 % (ref 39–49)
HGB BLD-MCNC: 16.2 G/DL (ref 13.7–17.3)
IMM GRANULOCYTES # BLD: 0.04 10*3/MM3 (ref 0–0.02)
IMM GRANULOCYTES NFR BLD: 0.5 % (ref 0–0.5)
LYMPHOCYTES # BLD AUTO: 2.93 10*3/MM3 (ref 0.6–4.2)
LYMPHOCYTES NFR BLD AUTO: 35.1 % (ref 10–50)
MCH RBC QN AUTO: 27.1 PG (ref 26.5–34)
MCHC RBC AUTO-ENTMCNC: 34.9 G/DL (ref 31.5–36.3)
MCV RBC AUTO: 77.6 FL (ref 80–98)
MONOCYTES # BLD AUTO: 0.73 10*3/MM3 (ref 0–0.9)
MONOCYTES NFR BLD AUTO: 8.8 % (ref 0–12)
NEUTROPHILS # BLD AUTO: 4.2 10*3/MM3 (ref 2–8.6)
NEUTROPHILS NFR BLD AUTO: 50.4 % (ref 37–80)
PLATELET # BLD AUTO: 256 10*3/MM3 (ref 150–450)
PMV BLD AUTO: 9.7 FL (ref 8–12)
RBC # BLD AUTO: 5.98 10*6/MM3 (ref 4.37–5.74)
WBC NRBC COR # BLD: 8.34 10*3/MM3 (ref 3.2–9.8)

## 2017-12-08 PROCEDURE — G0463 HOSPITAL OUTPT CLINIC VISIT: HCPCS | Performed by: INTERNAL MEDICINE

## 2017-12-08 PROCEDURE — 85025 COMPLETE CBC W/AUTO DIFF WBC: CPT

## 2017-12-08 PROCEDURE — 99406 BEHAV CHNG SMOKING 3-10 MIN: CPT | Performed by: INTERNAL MEDICINE

## 2017-12-08 PROCEDURE — 99213 OFFICE O/P EST LOW 20 MIN: CPT | Performed by: INTERNAL MEDICINE

## 2017-12-08 RX ORDER — LISINOPRIL AND HYDROCHLOROTHIAZIDE 20; 12.5 MG/1; MG/1
TABLET ORAL
Refills: 4 | COMMUNITY
Start: 2017-12-01 | End: 2019-11-15 | Stop reason: HOSPADM

## 2017-12-08 RX ORDER — RANITIDINE 300 MG/1
TABLET ORAL
Refills: 10 | COMMUNITY
Start: 2017-12-01 | End: 2019-11-15 | Stop reason: HOSPADM

## 2017-12-08 NOTE — PROGRESS NOTES
DATE OF VISIT: 12/8/2017    REASON FOR VISIT:  Secondary erythrocytosis    HISTORY OF PRESENT ILLNESS:    42-year-old male with a past medical history significant for hypertension, attention deficit hyperactivity disorder, history of nicotine addiction currently smoking about a pack to pack and half per day, history of snoring at night and history of testosterone injection for body building was seen in consultation on October 27, 2017 for evaluation of erythrocytosis.  He was found to have secondary erythrocytosis and had a therapeutic phlebotomy done on October 27 in November 10 of 2017.  Patient is here for follow-up visit today.  Denies any headache or dizziness.  Denies any abnormal swollen and anywhere in the body.    PAST MEDICAL HISTORY:    Past Medical History:   Diagnosis Date   • ADHD (attention deficit hyperactivity disorder)    • Hypertension        SOCIAL HISTORY:    Social History   Substance Use Topics   • Smoking status: Former Smoker     Packs/day: 1.00   • Smokeless tobacco: Former User   • Alcohol use Yes       Surgical History :  Past Surgical History:   Procedure Laterality Date   • AMPUTATION         ALLERGIES:    Allergies   Allergen Reactions   • Penicillins      Rash         FAMILY HISTORY:  Denies any family history of cancer.  Both mother and father have obstructive sleep apnea.        REVIEW OF SYSTEMS:      CONSTITUTIONAL: Positive for fatigue.  No fever, chills, or night sweats.     HEENT:  No epistaxis, mouth sores, or difficulty swallowing.    RESPIRATORY:  No new shortness of breath or cough at present.    CARDIOVASCULAR:  No chest pain or palpitations.    GASTROINTESTINAL:  No abdominal pain, nausea, vomiting, or blood in the stool.    GENITOURINARY:  No dysuria or hematuria.    MUSCULOSKELETAL:  No any new back pain or arthralgias.     NEUROLOGICAL:  No tingling or numbness. No new headache or dizziness.     LYMPHATICS:  Denies any abnormal swollen and anywhere in the  "body.    SKIN:  Denies any new skin rash.        PHYSICAL EXAMINATION:      VITAL SIGNS:  /79  Pulse 89  Temp 99.1 °F (37.3 °C) (Temporal Artery )   Resp 18  Ht 193 cm (75.98\")  Wt 106 kg (233 lb 7.5 oz)  BMI 28.43 kg/m2  Last 3 weights    12/08/17  1404   Weight: 106 kg (233 lb 7.5 oz)       ECOG  performance status: 0      GENERAL:  Not in any distress.    HEENT:  Normocephalic, Atraumatic.Mild Conjunctival pallor. No icterus. Extraocular Movements Intact. No Facial Asymmetry noted.    NECK:  No adenopathy. No JVD.    RESPIRATORY:  Fair air entry bilateral. No rhonchi or wheezing.    CARDIOVASCULAR:  S1, S2. Regular rate and rhythm. No murmur or gallop appreciated.    ABDOMEN:  Soft, obese, nontender. Bowel sounds present in all four quadrants.  No organomegaly appreciated.    EXTREMITIES:  No edema.No Calf Tenderness.    NEUROLOGIC:  Alert, awake and oriented ×3.  No  Motor or sensory deficit appreciated. Cranial Nerves 2-12 grossly intact.            DIAGNOSTIC DATA:    Glucose   Date Value Ref Range Status   10/27/2017 90 60 - 100 mg/dL Final     Sodium   Date Value Ref Range Status   10/27/2017 143 137 - 145 mmol/L Final     Potassium   Date Value Ref Range Status   10/27/2017 3.9 3.5 - 5.1 mmol/L Final     CO2   Date Value Ref Range Status   10/27/2017 26.0 22.0 - 31.0 mmol/L Final     Chloride   Date Value Ref Range Status   10/27/2017 104 95 - 110 mmol/L Final     Anion Gap   Date Value Ref Range Status   10/27/2017 13.0 5.0 - 15.0 mmol/L Final     Creatinine   Date Value Ref Range Status   10/27/2017 1.15 0.70 - 1.30 mg/dL Final     BUN   Date Value Ref Range Status   10/27/2017 8 7 - 21 mg/dL Final     BUN/Creatinine Ratio   Date Value Ref Range Status   10/27/2017 7.0 7.0 - 25.0 Final     Calcium   Date Value Ref Range Status   10/27/2017 9.0 8.4 - 10.2 mg/dL Final     eGFR Non  Amer   Date Value Ref Range Status   10/27/2017 70 >60 mL/min/1.73 Final     Alkaline Phosphatase   Date " Value Ref Range Status   10/27/2017 58 38 - 126 U/L Final     Total Protein   Date Value Ref Range Status   10/27/2017 7.9 6.3 - 8.6 g/dL Final     ALT (SGPT)   Date Value Ref Range Status   10/27/2017 61 21 - 72 U/L Final     AST (SGOT)   Date Value Ref Range Status   10/27/2017 42 17 - 59 U/L Final     Total Bilirubin   Date Value Ref Range Status   10/27/2017 0.7 0.2 - 1.3 mg/dL Final     Albumin   Date Value Ref Range Status   10/27/2017 4.30 3.40 - 4.80 g/dL Final     Globulin   Date Value Ref Range Status   10/27/2017 3.6 (H) 2.3 - 3.5 gm/dL Final     A/G Ratio   Date Value Ref Range Status   10/27/2017 1.2 1.1 - 1.8 g/dL Final     Lab Results   Component Value Date    WBC 8.34 12/08/2017    HGB 16.2 12/08/2017    HCT 46.4 12/08/2017    MCV 77.6 (L) 12/08/2017     12/08/2017     Lab Results   Component Value Date    NEUTROABS 4.20 12/08/2017    IRON 35 (L) 10/27/2017    TIBC 414 10/27/2017    LABIRON 8 (L) 10/27/2017    FERRITIN 42.50 10/27/2017     Erythropoietin      Ref Range & Units 2wk ago     Erythropoietin 2.6 - 18.5 mIU/mL 9.4   Comments: Siemens Immulite 2000 Immunochemiluminometric assay (ICMA)   Resulting Agency  [facility]   Narrative   Performed at:   - [address]  [id number]  : Rony Patricio PhD, Phone:  [phone]      Specimen Collected: 10/27/17  1:51 PM Last Resulted: 10/30/17  1:10 PM                JAK2 mutation testing on peripheral blood done on October 27, 2017 showed:  JAK2 V617F Mutation Comment   Comments: Result: NEGATIVE for the JAK2 V617F mutation.                   ASSESSMENT AND PLAN:      1.  Secondary erythrocytosis: Most likely secondary to smoking plus or minus history of testosterone supplement, last use of testosterone was in September 2017, patient also provides history of snoring at night suggestive of obstructive sleep apnea.  Patient is awaiting a sleep study that is scheduled for January 2018 for now.   His JAK2  mutation is negative and erythropoietin is in normal range consistent with a secondary erythrocytosis .Patient had Therapeutic phlebotomy done on October 27, 2017 and November 10, 2017.  In December hemoglobin is 16.2 today.  Patient states he has not smoked for last 3 weeks.  He was again counseled about the importance of smoking cessation.  We'll see him back in about 2 months with a repeat CBC on that day.    2.  Hypertension: He started taking his antihypertensive medications blood pressure is better control as compared to last clinic visit    3.  Health maintenance: Patient states he quit smoking around middle of November 2017.  About 3 minutes were spent for smoking cessation counseling.  His only 42 years of age and not due for colonoscopy at this point.  Remains full code.     Yasir Hardin MD  12/8/2017  2:45 PM        EMR Dragon/Transcription disclaimer:   Much of this encounter note is an electronic transcription/translation of spoken language to printed text. The electronic translation of spoken language may permit erroneous, or at times, nonsensical words or phrases to be inadvertently transcribed; Although I have reviewed the note for such errors, some may still exist.

## 2018-01-11 ENCOUNTER — CONSULT (OUTPATIENT)
Dept: SLEEP MEDICINE | Facility: HOSPITAL | Age: 43
End: 2018-01-11

## 2018-01-11 VITALS
WEIGHT: 238 LBS | HEIGHT: 76 IN | HEART RATE: 97 BPM | SYSTOLIC BLOOD PRESSURE: 110 MMHG | DIASTOLIC BLOOD PRESSURE: 76 MMHG | OXYGEN SATURATION: 97 % | BODY MASS INDEX: 28.98 KG/M2

## 2018-01-11 DIAGNOSIS — G47.33 OBSTRUCTIVE SLEEP APNEA, ADULT: Primary | ICD-10-CM

## 2018-01-11 PROCEDURE — 99203 OFFICE O/P NEW LOW 30 MIN: CPT | Performed by: INTERNAL MEDICINE

## 2018-01-11 NOTE — PROGRESS NOTES
New Patient Sleep Medicine Consultation    Encounter Date: 1/11/2018         Patient's PCP: Emma Esqueda DO  Referring provider: Yasir Hardin MD  Reason for consultation: snoring, awakening gasping for breath, witnessed apneas, excessive daytime sleepiness and unrefreshing sleep    Alex Miranda is a 42 y.o. male who presents with above complaints for many years. He was diagnosed with polycythemia in Nov 2017. He has stopped smoking since that time. He  admits to daytime fatigue, gasping during sleep, irritabillity, nocturia, sleepy driving, night sweats, difficulty staying asleep, and non-restorative sleep. His bedtime is ~ 2100 . He  falls asleep after 20 minutes, and is up 2-3 times per night. He wakes up ~ 0315. He drinks 0 cups of coffee, 0 teas, and 2 sodas per day. He drinks 0 alcoholic beverages per week. He quit smoking 2ppds in Nov. He denies abnormal dreams, cataplexy, sleep paralysis, or hypnagogic hallucinations. He does not take sedatives or hypnotics. He naps when unstimulated. Restless legs symptoms not present. He is on Adderall for ADHD    [place] - 15    Past Medical History:   Diagnosis Date   • ADHD (attention deficit hyperactivity disorder)    • Hypertension      Social History     Social History   • Marital status:      Spouse name: N/A   • Number of children: N/A   • Years of education: N/A     Occupational History   • Not on file.     Social History Main Topics   • Smoking status: Former Smoker     Packs/day: 1.00   • Smokeless tobacco: Former User   • Alcohol use Yes   • Drug use: Not on file   • Sexual activity: Not on file     Other Topics Concern   • Not on file     Social History Narrative     No family history on file.  , 3 kids  Manager at [employer]  Smoking history: smoked 2 ppds from age 18 until 42  FH of sleep disorders: mom and father    Review of Systems:  Pertinent items are noted in HPI. Patient advised to discuss any positive ROS with PCP.      Vitals:     01/11/18 1324   BP: 110/76   Pulse: 97   SpO2: 97%     Body mass index is 28.97 kg/(m^2).  Neck circumference: 40 cm            General: Alert. Cooperative. Well developed. No acute distress.             Head:  Normocephalic. Symmetrical. Atraumatic.              Eyes: Sclera clear. No icterus. PERRLA. Normal EOM.             Ears: No deformities. Normal hearing.             Nose: No septal deviation. No drainage.          Throat: No oral lesions. No thrush. Moist mucous membranes.    Tongue is enlarged    Dentition is good, very high arched palate with narrow bite       Pharynx: Posterior pharyngeal pillars are narrow    Mallampati score of IV (only hard palate visible)    Pharynx is normal with unrermarkable tonsils   Chest Wall:  Normal shape. Symmetric expansion with respiration. No tenderness.             Neck:  Trachea midline.           Lungs:  Clear to auscultation bilaterally. No wheezes. No rhonchi. No rales. Respirations regular, even and unlabored.            Heart:  Regular rhythm and normal rate. Normal S1 and S2. No murmur.     Abdomen:  Soft, non-tender and non-distended. Normal bowel sounds. No masses.  Extremities:  Moves all extremities well. No edema.           Pulses: Pulses palpable and equal bilaterally.               Skin: Dry. Intact. No bleeding. No rash.           Neuro: Moves all 4 extremities and cranial nerves grossly intact.  Psychiatric: Normal mood and affect.        Current Outpatient Prescriptions:   •  amphetamine-dextroamphetamine (ADDERALL) 10 MG tablet, Take 1 tab po daily x 7 days then 1 tab po bid, Disp: 60 tablet, Rfl: 0  •  lisinopril-hydrochlorothiazide (PRINZIDE,ZESTORETIC) 20-12.5 MG per tablet, TK 2 TS PO QAM FOR BLOOD PRESSURE, Disp: , Rfl: 4  •  raNITIdine (ZANTAC) 300 MG tablet, TK 1 T PO BID FOR STOMACH, Disp: , Rfl: 10    ASSESSMENT:  1. Obstructive sleep apnea    1. Split night study  2. ADHD on Adderall  3. Polycythemia  1. With blood draws  4. HTN         This  document has been electronically signed by Vishal Borrego MD on January 11, 2018         CC: DO Wilfred Vergara, Yasir TOWNSEND MD

## 2018-02-20 ENCOUNTER — HOSPITAL ENCOUNTER (OUTPATIENT)
Dept: SLEEP MEDICINE | Facility: HOSPITAL | Age: 43
Discharge: HOME | End: 2018-02-20
Attending: INTERNAL MEDICINE

## 2019-11-14 ENCOUNTER — APPOINTMENT (OUTPATIENT)
Dept: CARDIOLOGY | Facility: HOSPITAL | Age: 44
End: 2019-11-14

## 2019-11-14 ENCOUNTER — APPOINTMENT (OUTPATIENT)
Dept: CT IMAGING | Facility: HOSPITAL | Age: 44
End: 2019-11-14

## 2019-11-14 ENCOUNTER — HOSPITAL ENCOUNTER (INPATIENT)
Facility: HOSPITAL | Age: 44
End: 2019-11-14
Attending: INTERNAL MEDICINE | Admitting: INTERNAL MEDICINE

## 2019-11-14 ENCOUNTER — HOSPITAL ENCOUNTER (INPATIENT)
Facility: HOSPITAL | Age: 44
LOS: 1 days | Discharge: HOME | End: 2019-11-15
Attending: INTERNAL MEDICINE | Admitting: INTERNAL MEDICINE

## 2019-11-14 ENCOUNTER — HOSPITAL ENCOUNTER (OUTPATIENT)
Facility: HOSPITAL | Age: 44
Setting detail: HOSPITAL OUTPATIENT SURGERY
End: 2019-11-14
Attending: INTERNAL MEDICINE | Admitting: INTERNAL MEDICINE

## 2019-11-14 DIAGNOSIS — K21.9 GASTROESOPHAGEAL REFLUX DISEASE, ESOPHAGITIS PRESENCE NOT SPECIFIED: Primary | ICD-10-CM

## 2019-11-14 DIAGNOSIS — R07.9 CHEST PAIN AT REST: ICD-10-CM

## 2019-11-14 DIAGNOSIS — R06.83 SNORING: ICD-10-CM

## 2019-11-14 PROBLEM — I21.3 STEMI (ST ELEVATION MYOCARDIAL INFARCTION) (HCC): Status: ACTIVE | Noted: 2019-11-14

## 2019-11-14 LAB
ABO GROUP BLD: NORMAL
ALBUMIN SERPL-MCNC: 3.7 G/DL (ref 3.5–5.2)
ALBUMIN/GLOB SERPL: 1.3 G/DL
ALP SERPL-CCNC: 47 U/L (ref 39–117)
ALT SERPL W P-5'-P-CCNC: 41 U/L (ref 1–41)
ANION GAP SERPL CALCULATED.3IONS-SCNC: 8 MMOL/L (ref 5–15)
AST SERPL-CCNC: 36 U/L (ref 1–40)
BASOPHILS # BLD AUTO: 0.08 10*3/MM3 (ref 0–0.2)
BASOPHILS NFR BLD AUTO: 0.7 % (ref 0–1.5)
BH CV ECHO MEAS - BSA(HAYCOCK): 2.4 M^2
BH CV ECHO MEAS - BSA: 2.4 M^2
BH CV ECHO MEAS - BZI_BMI: 29 KILOGRAMS/M^2
BH CV ECHO MEAS - BZI_METRIC_HEIGHT: 193 CM
BH CV ECHO MEAS - BZI_METRIC_WEIGHT: 108 KG
BILIRUB SERPL-MCNC: 1.5 MG/DL (ref 0.2–1.2)
BLD GP AB SCN SERPL QL: NEGATIVE
BUN BLD-MCNC: 10 MG/DL (ref 6–20)
BUN/CREAT SERPL: 9.8 (ref 7–25)
CALCIUM SPEC-SCNC: 8.7 MG/DL (ref 8.6–10.5)
CHLORIDE SERPL-SCNC: 97 MMOL/L (ref 98–107)
CHOLEST SERPL-MCNC: 133 MG/DL (ref 0–200)
CK MB SERPL-CCNC: 2.06 NG/ML
CK SERPL-CCNC: 110 U/L (ref 20–200)
CO2 SERPL-SCNC: 29 MMOL/L (ref 22–29)
CREAT BLD-MCNC: 1.02 MG/DL (ref 0.76–1.27)
CRP SERPL-MCNC: 1.37 MG/DL (ref 0.01–0.5)
D-DIMER, QUANTITATIVE (MAD,POW, STR): <270 NG/ML (FEU) (ref 0–470)
DEPRECATED RDW RBC AUTO: 48.4 FL (ref 37–54)
EOSINOPHIL # BLD AUTO: 0.23 10*3/MM3 (ref 0–0.4)
EOSINOPHIL NFR BLD AUTO: 2 % (ref 0.3–6.2)
ERYTHROCYTE [DISTWIDTH] IN BLOOD BY AUTOMATED COUNT: 19.5 % (ref 12.3–15.4)
GFR SERPL CREATININE-BSD FRML MDRD: 79 ML/MIN/1.73
GLOBULIN UR ELPH-MCNC: 2.9 GM/DL
GLUCOSE BLD-MCNC: 103 MG/DL (ref 65–99)
HCT VFR BLD AUTO: 49.3 % (ref 37.5–51)
HDLC SERPL-MCNC: 35 MG/DL (ref 40–60)
HGB BLD-MCNC: 15.8 G/DL (ref 13–17.7)
IMM GRANULOCYTES # BLD AUTO: 0.04 10*3/MM3 (ref 0–0.05)
IMM GRANULOCYTES NFR BLD AUTO: 0.3 % (ref 0–0.5)
LDLC SERPL CALC-MCNC: 78 MG/DL (ref 0–100)
LDLC/HDLC SERPL: 2.22 {RATIO}
LYMPHOCYTES # BLD AUTO: 1.89 10*3/MM3 (ref 0.7–3.1)
LYMPHOCYTES NFR BLD AUTO: 16.2 % (ref 19.6–45.3)
Lab: NORMAL
MAXIMAL PREDICTED HEART RATE: 176 BPM
MCH RBC QN AUTO: 24.3 PG (ref 26.6–33)
MCHC RBC AUTO-ENTMCNC: 32 G/DL (ref 31.5–35.7)
MCV RBC AUTO: 75.8 FL (ref 79–97)
MONOCYTES # BLD AUTO: 1.18 10*3/MM3 (ref 0.1–0.9)
MONOCYTES NFR BLD AUTO: 10.1 % (ref 5–12)
MYOGLOBIN SERPL-MCNC: 38.2 NG/ML (ref 28–72)
NEUTROPHILS # BLD AUTO: 8.27 10*3/MM3 (ref 1.7–7)
NEUTROPHILS NFR BLD AUTO: 70.7 % (ref 42.7–76)
NRBC BLD AUTO-RTO: 0 /100 WBC (ref 0–0.2)
NT-PROBNP SERPL-MCNC: 18.6 PG/ML (ref 5–450)
PLATELET # BLD AUTO: 206 10*3/MM3 (ref 140–450)
PMV BLD AUTO: 9.8 FL (ref 6–12)
POTASSIUM BLD-SCNC: 3.4 MMOL/L (ref 3.5–5.2)
POTASSIUM BLD-SCNC: 3.4 MMOL/L (ref 3.5–5.2)
POTASSIUM BLD-SCNC: 3.8 MMOL/L (ref 3.5–5.2)
POTASSIUM BLD-SCNC: 3.9 MMOL/L (ref 3.5–5.2)
PROT SERPL-MCNC: 6.6 G/DL (ref 6–8.5)
RBC # BLD AUTO: 6.5 10*6/MM3 (ref 4.14–5.8)
RH BLD: POSITIVE
SODIUM BLD-SCNC: 134 MMOL/L (ref 136–145)
STRESS TARGET HR: 150 BPM
T&S EXPIRATION DATE: NORMAL
TRIGL SERPL-MCNC: 102 MG/DL (ref 0–150)
TROPONIN T SERPL-MCNC: <0.01 NG/ML (ref 0–0.03)
VLDLC SERPL-MCNC: 20.4 MG/DL
WBC NRBC COR # BLD: 11.69 10*3/MM3 (ref 3.4–10.8)

## 2019-11-14 PROCEDURE — 0 IOPAMIDOL PER 1 ML: Performed by: INTERNAL MEDICINE

## 2019-11-14 PROCEDURE — 86900 BLOOD TYPING SEROLOGIC ABO: CPT | Performed by: INTERNAL MEDICINE

## 2019-11-14 PROCEDURE — 80307 DRUG TEST PRSMV CHEM ANLYZR: CPT | Performed by: INTERNAL MEDICINE

## 2019-11-14 PROCEDURE — 85379 FIBRIN DEGRADATION QUANT: CPT | Performed by: INTERNAL MEDICINE

## 2019-11-14 PROCEDURE — 93005 ELECTROCARDIOGRAM TRACING: CPT | Performed by: INTERNAL MEDICINE

## 2019-11-14 PROCEDURE — 93458 L HRT ARTERY/VENTRICLE ANGIO: CPT | Performed by: INTERNAL MEDICINE

## 2019-11-14 PROCEDURE — C1760 CLOSURE DEV, VASC: HCPCS | Performed by: INTERNAL MEDICINE

## 2019-11-14 PROCEDURE — 93306 TTE W/DOPPLER COMPLETE: CPT

## 2019-11-14 PROCEDURE — 80061 LIPID PANEL: CPT | Performed by: INTERNAL MEDICINE

## 2019-11-14 PROCEDURE — 93010 ELECTROCARDIOGRAM REPORT: CPT | Performed by: INTERNAL MEDICINE

## 2019-11-14 PROCEDURE — 85025 COMPLETE CBC W/AUTO DIFF WBC: CPT | Performed by: INTERNAL MEDICINE

## 2019-11-14 PROCEDURE — 82550 ASSAY OF CK (CPK): CPT | Performed by: INTERNAL MEDICINE

## 2019-11-14 PROCEDURE — 86141 C-REACTIVE PROTEIN HS: CPT | Performed by: INTERNAL MEDICINE

## 2019-11-14 PROCEDURE — 25010000002 ONDANSETRON PER 1 MG: Performed by: INTERNAL MEDICINE

## 2019-11-14 PROCEDURE — 83874 ASSAY OF MYOGLOBIN: CPT | Performed by: INTERNAL MEDICINE

## 2019-11-14 PROCEDURE — B2151ZZ FLUOROSCOPY OF LEFT HEART USING LOW OSMOLAR CONTRAST: ICD-10-PCS | Performed by: INTERNAL MEDICINE

## 2019-11-14 PROCEDURE — 80053 COMPREHEN METABOLIC PANEL: CPT | Performed by: INTERNAL MEDICINE

## 2019-11-14 PROCEDURE — 93306 TTE W/DOPPLER COMPLETE: CPT | Performed by: INTERNAL MEDICINE

## 2019-11-14 PROCEDURE — 4A023N7 MEASUREMENT OF CARDIAC SAMPLING AND PRESSURE, LEFT HEART, PERCUTANEOUS APPROACH: ICD-10-PCS | Performed by: INTERNAL MEDICINE

## 2019-11-14 PROCEDURE — 25010000002 MIDAZOLAM PER 1 MG: Performed by: INTERNAL MEDICINE

## 2019-11-14 PROCEDURE — B2111ZZ FLUOROSCOPY OF MULTIPLE CORONARY ARTERIES USING LOW OSMOLAR CONTRAST: ICD-10-PCS | Performed by: INTERNAL MEDICINE

## 2019-11-14 PROCEDURE — 99223 1ST HOSP IP/OBS HIGH 75: CPT | Performed by: INTERNAL MEDICINE

## 2019-11-14 PROCEDURE — C1894 INTRO/SHEATH, NON-LASER: HCPCS | Performed by: INTERNAL MEDICINE

## 2019-11-14 PROCEDURE — 84132 ASSAY OF SERUM POTASSIUM: CPT | Performed by: INTERNAL MEDICINE

## 2019-11-14 PROCEDURE — C1769 GUIDE WIRE: HCPCS | Performed by: INTERNAL MEDICINE

## 2019-11-14 PROCEDURE — 83880 ASSAY OF NATRIURETIC PEPTIDE: CPT | Performed by: INTERNAL MEDICINE

## 2019-11-14 PROCEDURE — C1887 CATHETER, GUIDING: HCPCS

## 2019-11-14 PROCEDURE — 94799 UNLISTED PULMONARY SVC/PX: CPT

## 2019-11-14 PROCEDURE — 99232 SBSQ HOSP IP/OBS MODERATE 35: CPT | Performed by: INTERNAL MEDICINE

## 2019-11-14 PROCEDURE — C1894 INTRO/SHEATH, NON-LASER: HCPCS

## 2019-11-14 PROCEDURE — 25010000002 HYDRALAZINE PER 20 MG: Performed by: INTERNAL MEDICINE

## 2019-11-14 PROCEDURE — C1887 CATHETER, GUIDING: HCPCS | Performed by: INTERNAL MEDICINE

## 2019-11-14 PROCEDURE — 86850 RBC ANTIBODY SCREEN: CPT | Performed by: INTERNAL MEDICINE

## 2019-11-14 PROCEDURE — 84484 ASSAY OF TROPONIN QUANT: CPT | Performed by: INTERNAL MEDICINE

## 2019-11-14 PROCEDURE — 25010000002 BIVALIRUDIN TRIFLUOROACETATE 250 MG RECONSTITUTED SOLUTION: Performed by: INTERNAL MEDICINE

## 2019-11-14 PROCEDURE — 71275 CT ANGIOGRAPHY CHEST: CPT

## 2019-11-14 PROCEDURE — 86901 BLOOD TYPING SEROLOGIC RH(D): CPT | Performed by: INTERNAL MEDICINE

## 2019-11-14 PROCEDURE — 25010000002 BIVALIRUDIN TRIFLUOROACETATE 250 MG RECONSTITUTED SOLUTION 1 EACH VIAL: Performed by: INTERNAL MEDICINE

## 2019-11-14 PROCEDURE — 94760 N-INVAS EAR/PLS OXIMETRY 1: CPT

## 2019-11-14 PROCEDURE — 82553 CREATINE MB FRACTION: CPT | Performed by: INTERNAL MEDICINE

## 2019-11-14 PROCEDURE — 25010000002 FENTANYL CITRATE (PF) 100 MCG/2ML SOLUTION: Performed by: INTERNAL MEDICINE

## 2019-11-14 RX ORDER — HYDRALAZINE HYDROCHLORIDE 20 MG/ML
INJECTION INTRAMUSCULAR; INTRAVENOUS AS NEEDED
Status: DISCONTINUED | OUTPATIENT
Start: 2019-11-14 | End: 2019-11-14 | Stop reason: HOSPADM

## 2019-11-14 RX ORDER — ONDANSETRON 2 MG/ML
4 INJECTION INTRAMUSCULAR; INTRAVENOUS EVERY 6 HOURS PRN
Status: DISCONTINUED | OUTPATIENT
Start: 2019-11-14 | End: 2019-11-15 | Stop reason: HOSPADM

## 2019-11-14 RX ORDER — ACETAMINOPHEN 325 MG/1
650 TABLET ORAL EVERY 4 HOURS PRN
Status: DISCONTINUED | OUTPATIENT
Start: 2019-11-14 | End: 2019-11-15 | Stop reason: HOSPADM

## 2019-11-14 RX ORDER — ALUMINA, MAGNESIA, AND SIMETHICONE 2400; 2400; 240 MG/30ML; MG/30ML; MG/30ML
15 SUSPENSION ORAL ONCE
Status: COMPLETED | OUTPATIENT
Start: 2019-11-14 | End: 2019-11-14

## 2019-11-14 RX ORDER — ONDANSETRON 4 MG/1
4 TABLET, FILM COATED ORAL EVERY 6 HOURS PRN
Status: DISCONTINUED | OUTPATIENT
Start: 2019-11-14 | End: 2019-11-15 | Stop reason: HOSPADM

## 2019-11-14 RX ORDER — DIPHENHYDRAMINE HYDROCHLORIDE 50 MG/ML
25 INJECTION INTRAMUSCULAR; INTRAVENOUS EVERY 6 HOURS PRN
Status: DISCONTINUED | OUTPATIENT
Start: 2019-11-14 | End: 2019-11-15 | Stop reason: HOSPADM

## 2019-11-14 RX ORDER — SODIUM CHLORIDE 9 MG/ML
100 INJECTION, SOLUTION INTRAVENOUS CONTINUOUS
Status: DISCONTINUED | OUTPATIENT
Start: 2019-11-14 | End: 2019-11-15 | Stop reason: HOSPADM

## 2019-11-14 RX ORDER — HYDROCODONE BITARTRATE AND ACETAMINOPHEN 5; 325 MG/1; MG/1
1 TABLET ORAL EVERY 4 HOURS PRN
Status: DISCONTINUED | OUTPATIENT
Start: 2019-11-14 | End: 2019-11-15 | Stop reason: HOSPADM

## 2019-11-14 RX ORDER — DEXTROSE AND SODIUM CHLORIDE 5; .45 G/100ML; G/100ML
30 INJECTION, SOLUTION INTRAVENOUS CONTINUOUS PRN
Status: CANCELLED | OUTPATIENT
Start: 2019-11-14

## 2019-11-14 RX ORDER — HYDROCODONE BITARTRATE AND ACETAMINOPHEN 5; 325 MG/1; MG/1
1 TABLET ORAL EVERY 4 HOURS PRN
Status: DISCONTINUED | OUTPATIENT
Start: 2019-11-14 | End: 2019-11-14

## 2019-11-14 RX ORDER — FAMOTIDINE 20 MG/1
20 TABLET, FILM COATED ORAL DAILY
Status: DISCONTINUED | OUTPATIENT
Start: 2019-11-14 | End: 2019-11-15 | Stop reason: HOSPADM

## 2019-11-14 RX ORDER — DOCUSATE SODIUM 100 MG/1
100 CAPSULE, LIQUID FILLED ORAL DAILY
Status: DISCONTINUED | OUTPATIENT
Start: 2019-11-14 | End: 2019-11-15 | Stop reason: HOSPADM

## 2019-11-14 RX ORDER — ASPIRIN 81 MG/1
81 TABLET ORAL DAILY
Status: DISCONTINUED | OUTPATIENT
Start: 2019-11-14 | End: 2019-11-15 | Stop reason: HOSPADM

## 2019-11-14 RX ORDER — LIDOCAINE HYDROCHLORIDE 20 MG/ML
15 SOLUTION OROPHARYNGEAL ONCE
Status: COMPLETED | OUTPATIENT
Start: 2019-11-14 | End: 2019-11-14

## 2019-11-14 RX ORDER — TEMAZEPAM 15 MG/1
15 CAPSULE ORAL NIGHTLY PRN
Status: DISCONTINUED | OUTPATIENT
Start: 2019-11-14 | End: 2019-11-15 | Stop reason: HOSPADM

## 2019-11-14 RX ORDER — FENTANYL CITRATE 50 UG/ML
25 INJECTION, SOLUTION INTRAMUSCULAR; INTRAVENOUS
Status: COMPLETED | OUTPATIENT
Start: 2019-11-14 | End: 2019-11-14

## 2019-11-14 RX ORDER — POTASSIUM CHLORIDE 750 MG/1
40 CAPSULE, EXTENDED RELEASE ORAL AS NEEDED
Status: DISCONTINUED | OUTPATIENT
Start: 2019-11-14 | End: 2019-11-15 | Stop reason: HOSPADM

## 2019-11-14 RX ORDER — MIDAZOLAM HYDROCHLORIDE 1 MG/ML
INJECTION INTRAMUSCULAR; INTRAVENOUS AS NEEDED
Status: DISCONTINUED | OUTPATIENT
Start: 2019-11-14 | End: 2019-11-14 | Stop reason: HOSPADM

## 2019-11-14 RX ORDER — NITROGLYCERIN 0.4 MG/1
TABLET SUBLINGUAL AS NEEDED
Status: DISCONTINUED | OUTPATIENT
Start: 2019-11-14 | End: 2019-11-14 | Stop reason: HOSPADM

## 2019-11-14 RX ORDER — ACETAMINOPHEN 325 MG/1
650 TABLET ORAL EVERY 4 HOURS PRN
Status: DISCONTINUED | OUTPATIENT
Start: 2019-11-14 | End: 2019-11-14

## 2019-11-14 RX ORDER — ALPRAZOLAM 0.5 MG/1
0.5 TABLET ORAL 3 TIMES DAILY PRN
Status: DISCONTINUED | OUTPATIENT
Start: 2019-11-14 | End: 2019-11-15 | Stop reason: HOSPADM

## 2019-11-14 RX ORDER — FENTANYL CITRATE 50 UG/ML
INJECTION, SOLUTION INTRAMUSCULAR; INTRAVENOUS AS NEEDED
Status: DISCONTINUED | OUTPATIENT
Start: 2019-11-14 | End: 2019-11-14 | Stop reason: HOSPADM

## 2019-11-14 RX ORDER — NALOXONE HCL 0.4 MG/ML
0.4 VIAL (ML) INJECTION
Status: DISCONTINUED | OUTPATIENT
Start: 2019-11-14 | End: 2019-11-15 | Stop reason: HOSPADM

## 2019-11-14 RX ORDER — LIDOCAINE HYDROCHLORIDE 20 MG/ML
INJECTION, SOLUTION INFILTRATION; PERINEURAL AS NEEDED
Status: DISCONTINUED | OUTPATIENT
Start: 2019-11-14 | End: 2019-11-14 | Stop reason: HOSPADM

## 2019-11-14 RX ORDER — BIVALIRUDIN 250 MG/5ML
INJECTION, POWDER, LYOPHILIZED, FOR SOLUTION INTRAVENOUS AS NEEDED
Status: DISCONTINUED | OUTPATIENT
Start: 2019-11-14 | End: 2019-11-14 | Stop reason: HOSPADM

## 2019-11-14 RX ORDER — POTASSIUM CHLORIDE 1.5 G/1.77G
40 POWDER, FOR SOLUTION ORAL AS NEEDED
Status: DISCONTINUED | OUTPATIENT
Start: 2019-11-14 | End: 2019-11-15 | Stop reason: HOSPADM

## 2019-11-14 RX ADMIN — POTASSIUM CHLORIDE 40 MEQ: 750 CAPSULE, EXTENDED RELEASE ORAL at 18:02

## 2019-11-14 RX ADMIN — HYDROCODONE BITARTRATE AND ACETAMINOPHEN 1 TABLET: 5; 325 TABLET ORAL at 10:03

## 2019-11-14 RX ADMIN — HYDROCODONE BITARTRATE AND ACETAMINOPHEN 1 TABLET: 5; 325 TABLET ORAL at 14:05

## 2019-11-14 RX ADMIN — LISINOPRIL: 10 TABLET ORAL at 13:40

## 2019-11-14 RX ADMIN — LIDOCAINE HYDROCHLORIDE 15 ML: 20 SOLUTION ORAL; TOPICAL at 13:30

## 2019-11-14 RX ADMIN — IOPAMIDOL 92 ML: 755 INJECTION, SOLUTION INTRAVENOUS at 12:00

## 2019-11-14 RX ADMIN — SODIUM CHLORIDE 100 ML/HR: 9 INJECTION, SOLUTION INTRAVENOUS at 09:30

## 2019-11-14 RX ADMIN — FENTANYL CITRATE 25 MCG: 50 INJECTION, SOLUTION INTRAMUSCULAR; INTRAVENOUS at 10:02

## 2019-11-14 RX ADMIN — ALUMINUM HYDROXIDE, MAGNESIUM HYDROXIDE, AND DIMETHICONE 15 ML: 400; 400; 40 SUSPENSION ORAL at 13:30

## 2019-11-14 RX ADMIN — POTASSIUM CHLORIDE 40 MEQ: 750 CAPSULE, EXTENDED RELEASE ORAL at 13:40

## 2019-11-14 RX ADMIN — DOCUSATE SODIUM 100 MG: 100 CAPSULE, LIQUID FILLED ORAL at 10:02

## 2019-11-14 RX ADMIN — FAMOTIDINE 20 MG: 20 TABLET ORAL at 10:03

## 2019-11-14 RX ADMIN — ALPRAZOLAM 0.5 MG: 0.5 TABLET ORAL at 10:02

## 2019-11-14 RX ADMIN — FENTANYL CITRATE 25 MCG: 50 INJECTION, SOLUTION INTRAMUSCULAR; INTRAVENOUS at 11:18

## 2019-11-14 RX ADMIN — ONDANSETRON 4 MG: 2 INJECTION INTRAMUSCULAR; INTRAVENOUS at 10:02

## 2019-11-14 RX ADMIN — ALPRAZOLAM 0.5 MG: 0.5 TABLET ORAL at 19:37

## 2019-11-14 NOTE — CONSULTS
SUBJECTIVE:   11/14/2019    Name: Alex Miranda  DOD: 1975    REASON FOR CONSULT: Chest pain    Chief Complaint:   No chief complaint on file.      Subjective     Patient is 44 y.o. male With past medical history of ADHD, and hypertension Presents with mid sternal chest pain since yesterday.  He has chest pain at rest and nausea and denied abdominal pain, vomiting, diarrhea, constipation, rectal bleeding or weight loss.  He has GERD for the past several years for which he is taking over-the-counter Prilosec on as-needed basis with some help.  Denies dysphagia.  Coronary catheterization by Dr. Mims today was unremarkable.     ROS/HISTORY/ CURRENT MEDICATIONS/OBJECTIVE/VS/PE:   Review of Systems:   Review of Systems  Review of Systems   Constitutional: Negative for activity change, appetite change, chills, diaphoresis, fatigue, fever and unexpected weight change.   HENT: Negative for congestion, sore throat and trouble swallowing.    Neck: no adenopathy, thyromegaly or masses.  Respiratory: Negative for apnea, cough, choking, chest tightness, shortness of breath, wheezing and stridor.    Cardiovascular: has chest pain, Negative for palpitations and leg swelling.   Gastrointestinal: Has GERD, negative for abdominal distention, abdominal pain, anal bleeding, blood in stool, constipation, diarrhea, nausea, rectal pain and vomiting.   Musculoskeletal: Negative for arthralgias.   Extremities: no edema, cyanosis or clubbing.  Skin: Negative for color change, pallor, rash and wound.   Neurological: Negative for dizziness, syncope, weakness, light-headedness and headaches.   Psychiatric/Behavioral: Negative for agitation, behavioral problems, confusion and decreased concentration.     History:     Past Medical History:   Diagnosis Date   • ADHD (attention deficit hyperactivity disorder)    • Hypertension      Past Surgical History:   Procedure Laterality Date   • AMPUTATION       No family history on  file.  Social History     Tobacco Use   • Smoking status: Former Smoker     Packs/day: 1.00   • Smokeless tobacco: Former User   Substance Use Topics   • Alcohol use: Yes   • Drug use: Not on file     Medications Prior to Admission   Medication Sig Dispense Refill Last Dose   • amphetamine-dextroamphetamine (ADDERALL) 10 MG tablet Take 1 tab po daily x 7 days then 1 tab po bid 60 tablet 0 Taking   • lisinopril-hydrochlorothiazide (PRINZIDE,ZESTORETIC) 20-12.5 MG per tablet TK 2 TS PO QAM FOR BLOOD PRESSURE  4 Taking   • raNITIdine (ZANTAC) 300 MG tablet TK 1 T PO BID FOR STOMACH  10 Taking     Allergies:  Penicillins    I have reviewed the patient's medical history, surgical history and family history in the available medical record system.     Current Medications:     Current Facility-Administered Medications   Medication Dose Route Frequency Provider Last Rate Last Dose   • acetaminophen (TYLENOL) tablet 650 mg  650 mg Oral Q4H PRN Dariel Mims MD       • ALPRAZolam (XANAX) tablet 0.5 mg  0.5 mg Oral TID PRN Dariel Mims MD   0.5 mg at 11/14/19 1002   • aspirin EC tablet 81 mg  81 mg Oral Daily Dariel Mims MD       • diphenhydrAMINE (BENADRYL) injection 25 mg  25 mg Intravenous Q6H PRN Dariel Mims MD       • docusate sodium (COLACE) capsule 100 mg  100 mg Oral Daily Dariel Mims MD   100 mg at 11/14/19 1002   • famotidine (PEPCID) tablet 20 mg  20 mg Oral Daily Dariel Mims MD   20 mg at 11/14/19 1003   • HYDROcodone-acetaminophen (NORCO) 5-325 MG per tablet 1 tablet  1 tablet Oral Q4H PRN Dariel Mims MD       • lisinopril (PRINIVIL,ZESTRIL) 10 mg, hydroCHLOROthiazide (HYDRODIURIL) 12.5 mg for ZESTORETIC 10-12.5   Oral Q24H Dariel Mims MD       • naloxone (NARCAN) injection 0.4 mg  0.4 mg Intravenous Q5 Min PRN Dariel Mims MD       • ondansetron (ZOFRAN) tablet 4 mg  4 mg Oral Q6H PRN Dariel Mims MD        Or   • ondansetron  (ZOFRAN) injection 4 mg  4 mg Intravenous Q6H PRN Dariel Mims MD   4 mg at 11/14/19 1002   • potassium chloride (KLOR-CON) packet 40 mEq  40 mEq Oral PRN Dariel Mims MD       • potassium chloride (MICRO-K) CR capsule 40 mEq  40 mEq Oral PRN Dariel Mims MD   40 mEq at 11/14/19 1340   • sodium chloride 0.9 % infusion  100 mL/hr Intravenous Continuous Dariel Mims  mL/hr at 11/14/19 0930 100 mL/hr at 11/14/19 0930   • sodium chloride 0.9 % infusion  100 mL/hr Intravenous Continuous Dariel Mims  mL/hr at 11/14/19 1541 100 mL/hr at 11/14/19 1541   • temazepam (RESTORIL) capsule 15 mg  15 mg Oral Nightly PRN Dariel Mims MD           Objective     Physical Exam:   Temp:  [97.5 °F (36.4 °C)-97.6 °F (36.4 °C)] 97.6 °F (36.4 °C)  Heart Rate:  [76-96] 96  Resp:  [14-19] 14  BP: (125-159)/() 141/93    Physical Exam:  General Appearance:    Alert, cooperative, in no acute distress   Head:    Normocephalic, without obvious abnormality, atraumatic   Eyes:            Lids and lashes normal, conjunctivae and sclerae normal, no   icterus, no pallor, corneas clear, PERRLA   Ears:    Ears appear intact with no abnormalities noted   Throat:   No oral lesions, no thrush, oral mucosa moist   Neck:   No adenopathy, supple, trachea midline, no thyromegaly, no     carotid bruit, no JVD   Back:     No kyphosis present, no scoliosis present, no skin lesions,       erythema or scars, no tenderness to percussion or                   palpation,   range of motion normal   Lungs:     Clear to auscultation,respirations regular, even and                   unlabored    Heart:    Regular rhythm and normal rate, normal S1 and S2, no            murmur, no gallop, no rub, no click   Breast Exam:    Deferred   Abdomen:     Normal bowel sounds, no masses, no organomegaly, soft        non-tender, non-distended, no guarding, no rebound                 tenderness   Genitalia:    Deferred    Extremities:   Moves all extremities well, no edema, no cyanosis, no              redness   Pulses:   Pulses palpable and equal bilaterally   Skin:   No bleeding, bruising or rash   Lymph nodes:   No palpable adenopathy   Neurologic:   Cranial nerves 2 - 12 grossly intact, sensation intact, DTR        present and equal bilaterally      Results Review:     Lab Results   Component Value Date    WBC 11.69 (H) 11/14/2019    WBC 8.34 12/08/2017    WBC 8.84 11/10/2017    HGB 15.8 11/14/2019    HGB 16.2 12/08/2017    HGB 18.5 (H) 11/10/2017    HCT 49.3 11/14/2019    HCT 46.4 12/08/2017    HCT 53.6 (H) 11/10/2017     11/14/2019     12/08/2017     11/10/2017     Results from last 7 days   Lab Units 11/14/19  0825   ALK PHOS U/L 47   ALT (SGPT) U/L 41   AST (SGOT) U/L 36     Results from last 7 days   Lab Units 11/14/19  0825   BILIRUBIN mg/dL 1.5*   ALK PHOS U/L 47     No results found for: LIPASE  No results found for: INR      Radiology Review:  Imaging Results (Last 72 Hours)     Procedure Component Value Units Date/Time    CT Angiogram Chest [[id number]] Collected:  11/14/19 1057     Updated:  11/14/19 1131    Narrative:       [facility] [place]    Patient Name: JUAN JOSE HER    ORDERING: LUMA LOPEZ    ATTENDING: LUMA LOPEZ     REFERRING: LUMA LOPEZ    -----------------------    PROCEDURE: CT/CTA THORAX/PULMONARY WITH CONTRAST    CLINICAL INFORMATION:  Aortic dz, non-traumatic, known or suspect        TECHNIQUE: Axial images were obtained and multiplanar  reconstructions were made.    This exam was performed using radiation doses that are as low as  reasonably achievable (ALARA).  This exam was performed according to our departmental dose  optimization program, which includes automated exposure control,  adjustment of the mA and/or KV according to patient size and/or  use of iterative reconstruction technique.  CONTRAST:   100 cc intravenous Isovue 370  COMPARISON:  None available.    Note: Reconstructed MIP images were obtained in multiple  obliquities. No 3-D surface rendered images were obtained for  this exam.    FINDINGS:  PULMONARY CTA: The main pulmonary arteries and their major  branches are opacified with contrast without evidence of abnormal  filling defects.  THORACIC AORTA: Normal in caliber with a maximum diameter of 3.9  cm.    LUNGS/PLEURA: The lungs are normal.  TRACHEA AND BRONCHI:  The trachea and bronchi are patent.  MEDIASTINUM, PAULINA AND LYMPH NODES: The mediastinum, paulina and  lymph nodes are normal.  HEART AND PERICARDIUM: The heart and pericardium are normal.  UPPER ABDOMEN: Small hiatal hernia. Contrast noted in the renal  collecting systems bilaterally from recent heart catheterization.  OSSEOUS STRUCTURES: Unremarkable.      Impression:       CONCLUSION:  The thoracic aorta is upper normal in caliber with a maximum  diameter of 3.9 cm.    Electronically signed by:  Rusty Weber MD  11/14/2019 11:30 AM  CST Workstation: [id number]          I reviewed the patient's new clinical results.    I reviewed the patient's new imaging results and agree with the interpretation.     ASSESSMENT/PLAN:   ASSESSMENT: 1.  Midsternal chest pain likely due to atypical GERD  2.  Longstanding GERD rule out Quintana's    PLAN: 1.  Continue PPI and antireflux lifestyle.  2.  EGD in a.m. for further evaluation.  3.  Antireflux lifestyle  The risks, benefits, and alternatives of this procedure have been discussed with the patient or the responsible party. The patient understands and agrees to proceed.         Mick Segura MD  11/14/19  4:23 PM

## 2019-11-14 NOTE — PLAN OF CARE
Problem: Patient Care Overview  Goal: Plan of Care Review  Outcome: Ongoing (interventions implemented as appropriate)   11/14/19 4278   Coping/Psychosocial   Plan of Care Reviewed With patient   Plan of Care Review   Progress no change   OTHER   Outcome Summary Pt c/o pain. all cardiac tests negative. gi consult in. will continue to monitor.

## 2019-11-14 NOTE — PAYOR COMM NOTE
Jenifer Jones  [facility]  (P)[phone]  (F)[phone]    Ref#[id number]          Juan Jose Her (44 y.o. Male)     Date of Birth Social Security Number Address [address] Phone MRN    1975  [address] [facility] [facility] [address] [phone] [id number]    [facility] Status          None        Admission Date Admission Type Admitting Provider Attending Provider Department, Room/Bed    11/14/19 Urgent Dariel Mims MD Denny, Dolph Martel, MD [facility], 07/A    Discharge Date Discharge Disposition Discharge Destination                       Attending Provider:  Dariel Mims MD    Allergies:  Penicillins    Isolation:  None   Infection:  None   Code Status:  CPR    Ht:  --   Wt:  --    Admission Cmt:  None   Principal Problem:  None                Active Insurance as of 11/14/2019     Primary Coverage     Payor Plan Insurance Group Employer/Plan Group    [employer] A[employer] [id number]     Payor Plan Address Payor Plan Phone Number Payor Plan Fax Number Effective Dates    [address] [address]   1/2/2019 - None Entered    [facility] [address]       Subscriber Name Subscriber Birth Date Member ID       JUAN JOSE HER 1975 [id number]                 Emergency Contacts      (Rel.) Home Phone Work Phone Mobile Phone    Sergey Her (Spouse) [phone] -- [phone]               History & Physical      Dariel Mims MD at 11/14/19 0853            [facility]  HISTORY AND PHYSICAL  Juan Jose Her  44 y.o. male    Chief complaint: Midsternal chest pain    History of Present Illness: 4-year-old who woke up from sleep about 1 AM this morning with midsternal chest pain.  He continued for several hours and then he went to [facility] ECG was done which was read as acute inferior wall infarction.  He has sUBsternal constant chest pain is  been going on for several hours.  He is somewhat short of breath and has some nausea.  He has some earlier diaphoresis but is better.  He rates his chest pain is a 12 out of 10.  He has been taken to the cardiac catheterization for diagnostic imaging and possible PCI and stenting.    Allergies   Allergen Reactions   • Penicillins      Rash  Rash       Past Medical History:   Diagnosis Date   • ADHD (attention deficit hyperactivity disorder)    • Hypertension        Past Surgical History:   Procedure Laterality Date   • AMPUTATION         No family history on file.    Social History     Socioeconomic History   • Marital status:      Spouse name: Not on file   • Number of children: Not on file   • Years of education: Not on file   • Highest education level: Not on file   Tobacco Use   • Smoking status: Former Smoker     Packs/day: 1.00   • Smokeless tobacco: Former User   Substance and Sexual Activity   • Alcohol use: Yes       Prior to Admission medications    Medication Sig Start Date End Date Taking? Authorizing Provider   amphetamine-dextroamphetamine (ADDERALL) 10 MG tablet Take 1 tab po daily x 7 days then 1 tab po bid 1/18/17   Emma Esqueda, DO   lisinopril-hydrochlorothiazide (PRINZIDE,ZESTORETIC) 20-12.5 MG per tablet TK 2 TS PO QAM FOR BLOOD PRESSURE 12/1/17   Provider, MD Michael   raNITIdine (ZANTAC) 300 MG tablet TK 1 T PO BID FOR STOMACH 12/1/17   Provider, Historical, MD       Review of Systems:     Constitution: Denies any fatigue, fever or chills.    HENT: Denies any headache, hearing impairment.    Eyes: Denies any blurring of vision, or photophobia.     Cardiovascular:  As per history of present illness.     Respiratory system: Denies any COPD, shortness of breath.     Endocrine:  No history of hyperlipidemia, diabetes.       Musculoskeletal:  No history of arthritis with musculoskeletal problems.    Gastrointestinal: No nausea, vomiting, or melena.    Genitourinary: No dysuria or  hematuria.    Neurological: No history of seizure disorder, stroke, or memory problems.    Psychiatric/Behavioral: No history of depression, bipolar disorder or schizophrenia .    Hematological: No history of easy bruising.    OBJECTIVE:    /80   Pulse 85   Resp 15   SpO2 99%     Physical Exam:     Constitutional: Patient is oriented to person, place, and time.     Skin: Warm and dry.    Well developed and nourished in no acute distress .     Head: Normocephalic and atraumatic.     Eyes: Pupils are equal, round, and reactive to light.     Neck: Neck supple. No bruit in the carotids, no elevation of JVD.    Cardiovascular: [place] in the fifth intercostal space, regular rate, and rhythm,  S1 greater than S2, no S3 or S4, no gallop.    Pulmonary/Chest: Air entry is equal on both sides.  No wheezing or crackles.     Abdominal: Soft.  No hepatosplenomegaly, bowel sounds are present.    Musculoskeletal: No kyphoscoliosis.    Neurological: Alert and oriented to person, place, and time.  Cranial nerve are intact. No motor or sensory deficit.    Extremities: No edema, no radial femoral delay.    Psychiatric: Normal mood and affect. Behavior is normal.    Lab Results (last 24 hours)     Procedure Component Value Units Date/Time    Comprehensive Metabolic Panel [[id number]] Collected:  11/14/19 0825    Specimen:  Blood Updated:  11/14/19 0835    CBC & Differential [[id number]] Collected:  11/14/19 0825    Specimen:  Blood Updated:  11/14/19 0839    Narrative:       The following orders were created for panel order CBC & Differential.  Procedure                               Abnormality         Status                     ---------                               -----------         ------                     CBC Auto Differential[[id number]]        Abnormal            Final result                 Please view results for these tests on the individual orders.    D-dimer, Quantitative [[id number]] Collected:  11/14/19 0825     Specimen:  Blood Updated:  11/14/19 0835    CBC Auto Differential [[id number]]  (Abnormal) Collected:  11/14/19 0825    Specimen:  Blood Updated:  11/14/19 0839     WBC 11.69 10*3/mm3      RBC 6.50 10*6/mm3      Hemoglobin 15.8 g/dL      Hematocrit 49.3 %      MCV 75.8 fL      MCH 24.3 pg      MCHC 32.0 g/dL      RDW 19.5 %      RDW-SD 48.4 fl      MPV 9.8 fL      Platelets 206 10*3/mm3      Neutrophil % 70.7 %      Lymphocyte % 16.2 %      Monocyte % 10.1 %      Eosinophil % 2.0 %      Basophil % 0.7 %      Immature Grans % 0.3 %      Neutrophils, Absolute 8.27 10*3/mm3      Lymphocytes, Absolute 1.89 10*3/mm3      Monocytes, Absolute 1.18 10*3/mm3      Eosinophils, Absolute 0.23 10*3/mm3      Basophils, Absolute 0.08 10*3/mm3      Immature Grans, Absolute 0.04 10*3/mm3      nRBC 0.0 /100 WBC     Lipid Panel [[id number]] Collected:  11/14/19 0825    Specimen:  Blood Updated:  11/14/19 0835    Troponin [[id number]] Collected:  11/14/19 0825    Specimen:  Blood Updated:  11/14/19 0835    CK [[id number]] Collected:  11/14/19 0825    Specimen:  Blood Updated:  11/14/19 0835    CK-MB [[id number]] Collected:  11/14/19 0825    Specimen:  Blood Updated:  11/14/19 0835    BNP [[id number]] Collected:  11/14/19 0825    Specimen:  Blood Updated:  11/14/19 0835    Myoglobin, Serum [[id number]] Collected:  11/14/19 0825    Specimen:  Blood Updated:  11/14/19 0835    High Sensitivity CRP [[id number]] Collected:  11/14/19 0825    Specimen:  Blood Updated:  11/14/19 0835          A/P:  Acute ST segment elevation on ECG in the inferior leads company with constant chest pain.  After risk and benefits including heart attack stroke and death bypass surgery and bleeding was been explained to the patient he is being onto a emergency cardiac catheterization.  Recommendations will be pending this.  Labs are pending.          Dariel Mims MD  11/14/2019  8:54 AM    Part of this note may be an electronic transcription/translation of spoken  "language to printed text using the Dragon Dictation System.      Electronically signed by Dariel Mims MD at 11/14/19 0858       Emergency Department Notes     No notes of this type exist for this encounter.        Hospital Medications (all)       Dose Frequency Start End    acetaminophen (TYLENOL) tablet 650 mg 650 mg Every 4 Hours PRN 11/14/2019     Sig - Route: Take 2 tablets by mouth Every 4 (Four) Hours As Needed for Mild Pain  or Fever (temperature greater than 101F). - Oral    ALPRAZolam (XANAX) tablet 0.5 mg 0.5 mg 3 Times Daily PRN 11/14/2019 11/24/2019    Sig - Route: Take 1 tablet by mouth 3 (Three) Times a Day As Needed for Anxiety. - Oral    aspirin EC tablet 81 mg 81 mg Daily 11/14/2019     Sig - Route: Take 1 tablet by mouth Daily. - Oral    diphenhydrAMINE (BENADRYL) injection 25 mg 25 mg Every 6 Hours PRN 11/14/2019     Sig - Route: Infuse 0.5 mL into a venous catheter Every 6 (Six) Hours As Needed for Itching. - Intravenous    docusate sodium (COLACE) capsule 100 mg 100 mg Daily 11/14/2019     Sig - Route: Take 1 capsule by mouth Daily. - Oral    famotidine (PEPCID) tablet 20 mg 20 mg Daily 11/14/2019     Sig - Route: Take 1 tablet by mouth Daily. - Oral    fentaNYL citrate (PF) (SUBLIMAZE) injection 25 mcg 25 mcg Every 1 Hour PRN 11/14/2019 11/14/2019    Sig - Route: Infuse 0.5 mL into a venous catheter Every 1 (One) Hour As Needed for Severe Pain . - Intravenous    Linked Group 1:  \"And\" Linked Group Details        HYDROcodone-acetaminophen (NORCO) 5-325 MG per tablet 1 tablet 1 tablet Every 4 Hours PRN 11/14/2019 11/24/2019    Sig - Route: Take 1 tablet by mouth Every 4 (Four) Hours As Needed for Moderate Pain . - Oral    iopamidol (ISOVUE-370) 76 % injection 92 mL 92 mL Once in Imaging 11/14/2019 11/14/2019    Sig - Route: Infuse 92 mL into a venous catheter Once. - Intravenous    lisinopril (PRINIVIL,ZESTRIL) 10 mg, hydroCHLOROthiazide (HYDRODIURIL) 12.5 mg for ZESTORETIC 10-12.5  " "Every 24 Hours Scheduled 11/14/2019     Sig - Route: Take  by mouth Daily. - Oral    naloxone (NARCAN) injection 0.4 mg 0.4 mg Every 5 Minutes PRN 11/14/2019     Sig - Route: Infuse 1 mL into a venous catheter Every 5 (Five) Minutes As Needed for Respiratory Depression. - Intravenous    Linked Group 1:  \"And\" Linked Group Details        ondansetron (ZOFRAN) injection 4 mg 4 mg Every 6 Hours PRN 11/14/2019     Sig - Route: Infuse 2 mL into a venous catheter Every 6 (Six) Hours As Needed for Nausea or Vomiting. - Intravenous    Linked Group 2:  \"Or\" Linked Group Details        ondansetron (ZOFRAN) tablet 4 mg 4 mg Every 6 Hours PRN 11/14/2019     Sig - Route: Take 1 tablet by mouth Every 6 (Six) Hours As Needed for Nausea or Vomiting. - Oral    Linked Group 2:  \"Or\" Linked Group Details        potassium chloride (KLOR-CON) packet 40 mEq 40 mEq As Needed 11/14/2019     Sig - Route: Take 40 mEq by mouth As Needed (Potassium Replacement, See Admin Instructions). - Oral    potassium chloride (MICRO-K) CR capsule 40 mEq 40 mEq As Needed 11/14/2019     Sig - Route: Take 4 capsules by mouth As Needed (Potassium Replacement.  See Admin Instructions). - Oral    sodium chloride 0.9 % infusion 100 mL/hr Continuous 11/14/2019     Sig - Route: Infuse 100 mL/hr into a venous catheter Continuous. - Intravenous    temazepam (RESTORIL) capsule 15 mg 15 mg Nightly PRN 11/14/2019 11/24/2019    Sig - Route: Take 1 capsule by mouth At Night As Needed for Sleep. - Oral    Bivalirudin Trifluoroacetate (ANGIOMAX) 250 mg in sodium chloride 0.9 % 50 mL (5 mg/mL) infusion (Discontinued)  Continuous PRN 11/14/2019 11/14/2019    Sig: Continuous As Needed.    Reason for Discontinue: Patient Discharge    Bivalirudin Trifluoroacetate (ANGIOMAX) injection (Discontinued)  As Needed 11/14/2019 11/14/2019    Sig: As Needed.    Reason for Discontinue: Patient Discharge    fentaNYL citrate (PF) (SUBLIMAZE) injection (Discontinued)  As Needed 11/14/2019 " 11/14/2019    Sig: As Needed.    Reason for Discontinue: Patient Discharge    heparin 1000 units in sodium chloride 0.9% IV infusion (Discontinued)  As Needed 11/14/2019 11/14/2019    Sig: As Needed.    Reason for Discontinue: Patient Discharge    heparin 5000 units in sodium chloride 0.9% IV infusion (Discontinued)  As Needed 11/14/2019 11/14/2019    Sig: As Needed.    Reason for Discontinue: Patient Discharge    hydrALAZINE (APRESOLINE) injection (Discontinued)  As Needed 11/14/2019 11/14/2019    Sig: As Needed.    Reason for Discontinue: Patient Discharge    iopamidol (ISOVUE-370) 76 % injection (Discontinued)  As Needed 11/14/2019 11/14/2019    Sig: As Needed.    Reason for Discontinue: Patient Discharge    lidocaine (XYLOCAINE) 2% injection (Discontinued)  As Needed 11/14/2019 11/14/2019    Sig: As Needed.    Reason for Discontinue: Patient Discharge    midazolam (VERSED) injection (Discontinued)  As Needed 11/14/2019 11/14/2019    Sig: As Needed.    Reason for Discontinue: Patient Discharge    nitroglycerin (NITROSTAT) SL tablet (Discontinued)  As Needed 11/14/2019 11/14/2019    Sig: As Needed.    Reason for Discontinue: Patient Discharge            Lab Results (last 24 hours)     Procedure Component Value Units Date/Time    D-dimer, Quantitative [[id number]]  (Normal) Collected:  11/14/19 0825    Specimen:  Blood Updated:  11/14/19 0928     D-Dimer, Quantitative <270 ng/mL (FEU)     Narrative:       Dimer values <500 ng/ml FEU are FDA approved as aid in diagnosis of deep venous thrombosis and pulmonary embolism.  This test should not be used in an exclusion strategy with pretest probability alone.    A recent guideline regarding diagnosis for pulmonary thromboembolism recommends an adjusted exclusion criterion of age x 10 ng/ml FEU for patients >50 years of age ([employer]ari Intern Med 2015; 163: 701-711).    Comprehensive Metabolic Panel [[id number]]  (Abnormal) Collected:  11/14/19 0825    Specimen:  Blood Updated:   11/14/19 0903     Glucose 103 mg/dL      BUN 10 mg/dL      Creatinine 1.02 mg/dL      Sodium 134 mmol/L      Potassium 3.4 mmol/L      Chloride 97 mmol/L      CO2 29.0 mmol/L      Calcium 8.7 mg/dL      Total Protein 6.6 g/dL      Albumin 3.70 g/dL      ALT (SGPT) 41 U/L      AST (SGOT) 36 U/L      Alkaline Phosphatase 47 U/L      Total Bilirubin 1.5 mg/dL      eGFR Non African Amer 79 mL/min/1.73      Globulin 2.9 gm/dL      A/G Ratio 1.3 g/dL      BUN/Creatinine Ratio 9.8     Anion Gap 8.0 mmol/L     Narrative:       GFR Normal >60  Chronic Kidney Disease <60  Kidney Failure <15    Lipid Panel [[id number]]  (Abnormal) Collected:  11/14/19 0825    Specimen:  Blood Updated:  11/14/19 0903     Total Cholesterol 133 mg/dL      Triglycerides 102 mg/dL      HDL Cholesterol 35 mg/dL      LDL Cholesterol  78 mg/dL      VLDL Cholesterol 20.4 mg/dL      LDL/HDL Ratio 2.22    Narrative:       Cholesterol Reference Ranges  (U.S. Department of Health and Human Services ATP III Classifications)    Desirable          <200 mg/dL  Borderline High    200-239 mg/dL  High Risk          >240 mg/dL      Triglyceride Reference Ranges  (U.S. Department of Health and Human Services ATP III Classifications)    Normal           <150 mg/dL  Borderline High  150-199 mg/dL  High             200-499 mg/dL  Very High        >500 mg/dL    HDL Reference Ranges  (U.S. Department of Health and Human Services ATP III Classifcations)    Low     <40 mg/dl (major risk factor for CHD)  High    >60 mg/dl ('negative' risk factor for CHD)        LDL Reference Ranges  (U.S. Department of Health and Human Services ATP III Classifcations)    Optimal          <100 mg/dL  Near Optimal     100-129 mg/dL  Borderline High  130-159 mg/dL  High             160-189 mg/dL  Very High        >189 mg/dL    CK [[id number]]  (Normal) Collected:  11/14/19 0825    Specimen:  Blood Updated:  11/14/19 0903     Creatine Kinase 110 U/L     Troponin [[id number]]  (Normal) Collected:   11/14/19 0825    Specimen:  Blood Updated:  11/14/19 0902     Troponin T <0.010 ng/mL     Narrative:       Troponin T Reference Range:  <= 0.03 ng/mL-   Negative for AMI  >0.03 ng/mL-     Abnormal for myocardial necrosis.  Clinicians would have to utilize clinical acumen, EKG, Troponin and serial changes to determine if it is an Acute Myocardial Infarction or myocardial injury due to an underlying chronic condition.     CK-MB [[id number]]  (Normal) Collected:  11/14/19 0825    Specimen:  Blood Updated:  11/14/19 0901     CKMB 2.06 ng/mL     BNP [[id number]]  (Normal) Collected:  11/14/19 0825    Specimen:  Blood Updated:  11/14/19 0901     proBNP 18.6 pg/mL     Narrative:       Among patients with dyspnea, NT-proBNP is highly sensitive for the detection of acute congestive heart failure. In addition NT-proBNP of <300 pg/ml effectively rules out acute congestive heart failure with 99% negative predictive value.    CBC & Differential [[id number]] Collected:  11/14/19 0825    Specimen:  Blood Updated:  11/14/19 0839    Narrative:       The following orders were created for panel order CBC & Differential.  Procedure                               Abnormality         Status                     ---------                               -----------         ------                     CBC Auto Differential[[id number]]        Abnormal            Final result                 Please view results for these tests on the individual orders.    CBC Auto Differential [[id number]]  (Abnormal) Collected:  11/14/19 0825    Specimen:  Blood Updated:  11/14/19 0839     WBC 11.69 10*3/mm3      RBC 6.50 10*6/mm3      Hemoglobin 15.8 g/dL      Hematocrit 49.3 %      MCV 75.8 fL      MCH 24.3 pg      MCHC 32.0 g/dL      RDW 19.5 %      RDW-SD 48.4 fl      MPV 9.8 fL      Platelets 206 10*3/mm3      Neutrophil % 70.7 %      Lymphocyte % 16.2 %      Monocyte % 10.1 %      Eosinophil % 2.0 %      Basophil % 0.7 %      Immature Grans % 0.3 %       Neutrophils, Absolute 8.27 10*3/mm3      Lymphocytes, Absolute 1.89 10*3/mm3      Monocytes, Absolute 1.18 10*3/mm3      Eosinophils, Absolute 0.23 10*3/mm3      Basophils, Absolute 0.08 10*3/mm3      Immature Grans, Absolute 0.04 10*3/mm3      nRBC 0.0 /100 WBC     Myoglobin, Serum [[id number]] Collected:  11/14/19 0825    Specimen:  Blood Updated:  11/14/19 0835    High Sensitivity CRP [[id number]] Collected:  11/14/19 0825    Specimen:  Blood Updated:  11/14/19 0835        Imaging Results (Last 24 Hours)     Procedure Component Value Units Date/Time    CT Angiogram Chest [[id number]] Updated:  11/14/19 1103        ECG/EMG Results (last 24 hours)     Procedure Component Value Units Date/Time    ECG 12 Lead [[id number]] Collected:  11/14/19 0840     Updated:  11/14/19 0840    Narrative:       Test Reason : STEMI  Blood Pressure : **/** mmHG  Vent. Rate : 085 BPM     Atrial Rate : 085 BPM     P-R Int : 168 ms          QRS Dur : 102 ms      QT Int : 374 ms       P-R-T Axes : 065 090 055 degrees     QTc Int : 445 ms    Normal sinus rhythm  Rightward axis  ST elevation, consider inferior injury or acute infarct  ** ** ACUTE MI ** **  Abnormal ECG  No previous ECGs available    Referred By:             Confirmed By:     STAT Adult Transthoracic Echo Complete W/ Cont if Necessary Per Protocol [[id number]] Collected:  11/14/19 0904     Updated:  11/14/19 0938     BSA 2.4 m^2       CV ECHO PARVEZ - BZI_BMI 29.0 kilograms/m^2       CV ECHO PARVEZ - BSA(HAYCOCK) 2.4 m^2      BH CV ECHO PARVEZ - BZI_METRIC_WEIGHT 108.0 kg      BH CV ECHO PARVEZ - BZI_METRIC_HEIGHT 193.0 cm      Target HR (85%) 150 bpm      Max. Pred. HR (100%) 176 bpm           Physician Progress Notes (last 24 hours) (Notes from 11/13/19 1123 through 11/14/19 1123)     No notes of this type exist for this encounter.        Consult Notes (last 24 hours) (Notes from 11/13/19 1123 through 11/14/19 1123)     No notes of this type exist for this encounter.

## 2019-11-14 NOTE — H&P
[facility] Cardiology  HISTORY AND PHYSICAL  Alex Miranda  44 y.o. male    Chief complaint: Midsternal chest pain    History of Present Illness: 4-year-old who woke up from sleep about 1 AM this morning with midsternal chest pain.  He continued for several hours and then he went to [facility] ECG was done which was read as acute inferior wall infarction.  He has sUBsternal constant chest pain is been going on for several hours.  He is somewhat short of breath and has some nausea.  He has some earlier diaphoresis but is better.  He rates his chest pain is a 12 out of 10.  He has been taken to the cardiac catheterization for diagnostic imaging and possible PCI and stenting.    Allergies   Allergen Reactions   • Penicillins      Rash  Rash       Past Medical History:   Diagnosis Date   • ADHD (attention deficit hyperactivity disorder)    • Hypertension        Past Surgical History:   Procedure Laterality Date   • AMPUTATION         No family history on file.    Social History     Socioeconomic History   • Marital status:      Spouse name: Not on file   • Number of children: Not on file   • Years of education: Not on file   • Highest education level: Not on file   Tobacco Use   • Smoking status: Former Smoker     Packs/day: 1.00   • Smokeless tobacco: Former User   Substance and Sexual Activity   • Alcohol use: Yes       Prior to Admission medications    Medication Sig Start Date End Date Taking? Authorizing Provider   amphetamine-dextroamphetamine (ADDERALL) 10 MG tablet Take 1 tab po daily x 7 days then 1 tab po bid 1/18/17   Emma Esqueda,    lisinopril-hydrochlorothiazide (PRINZIDE,ZESTORETIC) 20-12.5 MG per tablet TK 2 TS PO QAM FOR BLOOD PRESSURE 12/1/17   Provider, MD Michael   raNITIdine (ZANTAC) 300 MG tablet TK 1 T PO BID FOR STOMACH 12/1/17   Provider, MD Michael       Review of Systems:     Constitution: Denies any fatigue, fever or  chills.    HENT: Denies any headache, hearing impairment.    Eyes: Denies any blurring of vision, or photophobia.     Cardiovascular:  As per history of present illness.     Respiratory system: Denies any COPD, shortness of breath.     Endocrine:  No history of hyperlipidemia, diabetes.       Musculoskeletal:  No history of arthritis with musculoskeletal problems.    Gastrointestinal: No nausea, vomiting, or melena.    Genitourinary: No dysuria or hematuria.    Neurological: No history of seizure disorder, stroke, or memory problems.    Psychiatric/Behavioral: No history of depression, bipolar disorder or schizophrenia .    Hematological: No history of easy bruising.    OBJECTIVE:    /80   Pulse 85   Resp 15   SpO2 99%     Physical Exam:     Constitutional: Patient is oriented to person, place, and time.     Skin: Warm and dry.    Well developed and nourished in no acute distress .     Head: Normocephalic and atraumatic.     Eyes: Pupils are equal, round, and reactive to light.     Neck: Neck supple. No bruit in the carotids, no elevation of JVD.    Cardiovascular: [place] in the fifth intercostal space, regular rate, and rhythm,  S1 greater than S2, no S3 or S4, no gallop.    Pulmonary/Chest: Air entry is equal on both sides.  No wheezing or crackles.     Abdominal: Soft.  No hepatosplenomegaly, bowel sounds are present.    Musculoskeletal: No kyphoscoliosis.    Neurological: Alert and oriented to person, place, and time.  Cranial nerve are intact. No motor or sensory deficit.    Extremities: No edema, no radial femoral delay.    Psychiatric: Normal mood and affect. Behavior is normal.    Lab Results (last 24 hours)     Procedure Component Value Units Date/Time    Comprehensive Metabolic Panel [[id number]] Collected:  11/14/19 0825    Specimen:  Blood Updated:  11/14/19 0835    CBC & Differential [[id number]] Collected:  11/14/19 0825    Specimen:  Blood Updated:  11/14/19 0839    Narrative:       The following  orders were created for panel order CBC & Differential.  Procedure                               Abnormality         Status                     ---------                               -----------         ------                     CBC Auto Differential[[id number]]        Abnormal            Final result                 Please view results for these tests on the individual orders.    D-dimer, Quantitative [[id number]] Collected:  11/14/19 0825    Specimen:  Blood Updated:  11/14/19 0835    CBC Auto Differential [[id number]]  (Abnormal) Collected:  11/14/19 0825    Specimen:  Blood Updated:  11/14/19 0839     WBC 11.69 10*3/mm3      RBC 6.50 10*6/mm3      Hemoglobin 15.8 g/dL      Hematocrit 49.3 %      MCV 75.8 fL      MCH 24.3 pg      MCHC 32.0 g/dL      RDW 19.5 %      RDW-SD 48.4 fl      MPV 9.8 fL      Platelets 206 10*3/mm3      Neutrophil % 70.7 %      Lymphocyte % 16.2 %      Monocyte % 10.1 %      Eosinophil % 2.0 %      Basophil % 0.7 %      Immature Grans % 0.3 %      Neutrophils, Absolute 8.27 10*3/mm3      Lymphocytes, Absolute 1.89 10*3/mm3      Monocytes, Absolute 1.18 10*3/mm3      Eosinophils, Absolute 0.23 10*3/mm3      Basophils, Absolute 0.08 10*3/mm3      Immature Grans, Absolute 0.04 10*3/mm3      nRBC 0.0 /100 WBC     Lipid Panel [[id number]] Collected:  11/14/19 0825    Specimen:  Blood Updated:  11/14/19 0835    Troponin [[id number]] Collected:  11/14/19 0825    Specimen:  Blood Updated:  11/14/19 0835    CK [[id number]] Collected:  11/14/19 0825    Specimen:  Blood Updated:  11/14/19 0835    CK-MB [[id number]] Collected:  11/14/19 0825    Specimen:  Blood Updated:  11/14/19 0835    BNP [[id number]] Collected:  11/14/19 0825    Specimen:  Blood Updated:  11/14/19 0835    Myoglobin, Serum [[id number]] Collected:  11/14/19 0825    Specimen:  Blood Updated:  11/14/19 0835    High Sensitivity CRP [[id number]] Collected:  11/14/19 0825    Specimen:  Blood Updated:  11/14/19 0835          A/P:  Acute ST  segment elevation on ECG in the inferior leads company with constant chest pain.  After risk and benefits including heart attack stroke and death bypass surgery and bleeding was been explained to the patient he is being onto a emergency cardiac catheterization.  Recommendations will be pending this.  Labs are pending.          Dariel Mims MD  11/14/2019  8:54 AM    Part of this note may be an electronic transcription/translation of spoken language to printed text using the Dragon Dictation System.

## 2019-11-15 ENCOUNTER — ANESTHESIA (OUTPATIENT)
Dept: GASTROENTEROLOGY | Facility: HOSPITAL | Age: 44
End: 2019-11-15

## 2019-11-15 ENCOUNTER — ANESTHESIA EVENT (OUTPATIENT)
Dept: GASTROENTEROLOGY | Facility: HOSPITAL | Age: 44
End: 2019-11-15

## 2019-11-15 VITALS
WEIGHT: 274.69 LBS | DIASTOLIC BLOOD PRESSURE: 90 MMHG | HEIGHT: 76 IN | BODY MASS INDEX: 33.45 KG/M2 | SYSTOLIC BLOOD PRESSURE: 136 MMHG | TEMPERATURE: 97.9 F | HEART RATE: 87 BPM | OXYGEN SATURATION: 95 % | RESPIRATION RATE: 19 BRPM

## 2019-11-15 LAB
AMPHET+METHAMPHET UR QL: POSITIVE
AMPHETAMINES UR QL: NEGATIVE
ANION GAP SERPL CALCULATED.3IONS-SCNC: 13 MMOL/L (ref 5–15)
BARBITURATES UR QL SCN: NEGATIVE
BENZODIAZ UR QL SCN: POSITIVE
BUN BLD-MCNC: 8 MG/DL (ref 6–20)
BUN/CREAT SERPL: 9.1 (ref 7–25)
BUPRENORPHINE SERPL-MCNC: NEGATIVE NG/ML
CALCIUM SPEC-SCNC: 8.6 MG/DL (ref 8.6–10.5)
CANNABINOIDS SERPL QL: NEGATIVE
CHLORIDE SERPL-SCNC: 101 MMOL/L (ref 98–107)
CHOLEST SERPL-MCNC: 118 MG/DL (ref 0–200)
CO2 SERPL-SCNC: 22 MMOL/L (ref 22–29)
COCAINE UR QL: NEGATIVE
CREAT BLD-MCNC: 0.88 MG/DL (ref 0.76–1.27)
DEPRECATED RDW RBC AUTO: 51.1 FL (ref 37–54)
ERYTHROCYTE [DISTWIDTH] IN BLOOD BY AUTOMATED COUNT: 20.4 % (ref 12.3–15.4)
GFR SERPL CREATININE-BSD FRML MDRD: 94 ML/MIN/1.73
GLUCOSE BLD-MCNC: 87 MG/DL (ref 65–99)
HBA1C MFR BLD: 5.6 % (ref 4.8–5.6)
HCT VFR BLD AUTO: 53.1 % (ref 37.5–51)
HDLC SERPL-MCNC: 44 MG/DL (ref 40–60)
HGB BLD-MCNC: 16.9 G/DL (ref 13–17.7)
LDLC SERPL CALC-MCNC: 57 MG/DL (ref 0–100)
LDLC/HDLC SERPL: 1.3 {RATIO}
MCH RBC QN AUTO: 24.5 PG (ref 26.6–33)
MCHC RBC AUTO-ENTMCNC: 31.8 G/DL (ref 31.5–35.7)
MCV RBC AUTO: 77 FL (ref 79–97)
METHADONE UR QL SCN: NEGATIVE
OPIATES UR QL: POSITIVE
OXYCODONE UR QL SCN: NEGATIVE
PCP UR QL SCN: NEGATIVE
PLATELET # BLD AUTO: 181 10*3/MM3 (ref 140–450)
PMV BLD AUTO: 10.6 FL (ref 6–12)
POTASSIUM BLD-SCNC: 4 MMOL/L (ref 3.5–5.2)
PROPOXYPH UR QL: NEGATIVE
RBC # BLD AUTO: 6.9 10*6/MM3 (ref 4.14–5.8)
SODIUM BLD-SCNC: 136 MMOL/L (ref 136–145)
TRICYCLICS UR QL SCN: NEGATIVE
TRIGL SERPL-MCNC: 83 MG/DL (ref 0–150)
TROPONIN T SERPL-MCNC: <0.01 NG/ML (ref 0–0.03)
VLDLC SERPL-MCNC: 16.6 MG/DL
WBC NRBC COR # BLD: 9.43 10*3/MM3 (ref 3.4–10.8)

## 2019-11-15 PROCEDURE — 88342 IMHCHEM/IMCYTCHM 1ST ANTB: CPT | Performed by: INTERNAL MEDICINE

## 2019-11-15 PROCEDURE — 0DB58ZX EXCISION OF ESOPHAGUS, VIA NATURAL OR ARTIFICIAL OPENING ENDOSCOPIC, DIAGNOSTIC: ICD-10-PCS | Performed by: INTERNAL MEDICINE

## 2019-11-15 PROCEDURE — 80061 LIPID PANEL: CPT | Performed by: INTERNAL MEDICINE

## 2019-11-15 PROCEDURE — 93005 ELECTROCARDIOGRAM TRACING: CPT | Performed by: INTERNAL MEDICINE

## 2019-11-15 PROCEDURE — 0DB68ZX EXCISION OF STOMACH, VIA NATURAL OR ARTIFICIAL OPENING ENDOSCOPIC, DIAGNOSTIC: ICD-10-PCS | Performed by: INTERNAL MEDICINE

## 2019-11-15 PROCEDURE — 43239 EGD BIOPSY SINGLE/MULTIPLE: CPT | Performed by: INTERNAL MEDICINE

## 2019-11-15 PROCEDURE — 93010 ELECTROCARDIOGRAM REPORT: CPT | Performed by: INTERNAL MEDICINE

## 2019-11-15 PROCEDURE — 83036 HEMOGLOBIN GLYCOSYLATED A1C: CPT | Performed by: INTERNAL MEDICINE

## 2019-11-15 PROCEDURE — 99232 SBSQ HOSP IP/OBS MODERATE 35: CPT | Performed by: INTERNAL MEDICINE

## 2019-11-15 PROCEDURE — 85027 COMPLETE CBC AUTOMATED: CPT | Performed by: INTERNAL MEDICINE

## 2019-11-15 PROCEDURE — 84484 ASSAY OF TROPONIN QUANT: CPT | Performed by: INTERNAL MEDICINE

## 2019-11-15 PROCEDURE — 80048 BASIC METABOLIC PNL TOTAL CA: CPT | Performed by: INTERNAL MEDICINE

## 2019-11-15 PROCEDURE — 25010000002 PROPOFOL 10 MG/ML EMULSION: Performed by: NURSE ANESTHETIST, CERTIFIED REGISTERED

## 2019-11-15 PROCEDURE — 80307 DRUG TEST PRSMV CHEM ANLYZR: CPT | Performed by: INTERNAL MEDICINE

## 2019-11-15 PROCEDURE — 88305 TISSUE EXAM BY PATHOLOGIST: CPT | Performed by: INTERNAL MEDICINE

## 2019-11-15 PROCEDURE — 88305 TISSUE EXAM BY PATHOLOGIST: CPT | Performed by: PATHOLOGY

## 2019-11-15 RX ORDER — HYDROCHLOROTHIAZIDE 12.5 MG/1
12.5 CAPSULE, GELATIN COATED ORAL DAILY
Status: DISCONTINUED | OUTPATIENT
Start: 2019-11-15 | End: 2019-11-15 | Stop reason: HOSPADM

## 2019-11-15 RX ORDER — LISINOPRIL 20 MG/1
20 TABLET ORAL
Status: DISCONTINUED | OUTPATIENT
Start: 2019-11-15 | End: 2019-11-15 | Stop reason: HOSPADM

## 2019-11-15 RX ORDER — METOPROLOL SUCCINATE 50 MG/1
50 TABLET, EXTENDED RELEASE ORAL
Status: DISCONTINUED | OUTPATIENT
Start: 2019-11-15 | End: 2019-11-15 | Stop reason: HOSPADM

## 2019-11-15 RX ORDER — PROPOFOL 10 MG/ML
VIAL (ML) INTRAVENOUS AS NEEDED
Status: DISCONTINUED | OUTPATIENT
Start: 2019-11-15 | End: 2019-11-15 | Stop reason: SURG

## 2019-11-15 RX ORDER — LIDOCAINE HYDROCHLORIDE 20 MG/ML
INJECTION, SOLUTION INTRAVENOUS AS NEEDED
Status: DISCONTINUED | OUTPATIENT
Start: 2019-11-15 | End: 2019-11-15 | Stop reason: SURG

## 2019-11-15 RX ADMIN — ASPIRIN 81 MG: 81 TABLET, COATED ORAL at 09:25

## 2019-11-15 RX ADMIN — PROPOFOL 20 MG: 10 INJECTION, EMULSION INTRAVENOUS at 15:34

## 2019-11-15 RX ADMIN — LISINOPRIL 20 MG: 20 TABLET ORAL at 09:11

## 2019-11-15 RX ADMIN — DOCUSATE SODIUM 100 MG: 100 CAPSULE, LIQUID FILLED ORAL at 09:26

## 2019-11-15 RX ADMIN — HYDROCHLOROTHIAZIDE 12.5 MG: 12.5 CAPSULE ORAL at 09:11

## 2019-11-15 RX ADMIN — LIDOCAINE HYDROCHLORIDE 100 MG: 20 INJECTION, SOLUTION INTRAVENOUS at 15:33

## 2019-11-15 RX ADMIN — PROPOFOL 120 MG: 10 INJECTION, EMULSION INTRAVENOUS at 15:33

## 2019-11-15 RX ADMIN — METOPROLOL SUCCINATE 50 MG: 50 TABLET, EXTENDED RELEASE ORAL at 09:11

## 2019-11-15 RX ADMIN — PROPOFOL 30 MG: 10 INJECTION, EMULSION INTRAVENOUS at 15:35

## 2019-11-15 RX ADMIN — FAMOTIDINE 20 MG: 20 TABLET ORAL at 09:11

## 2019-11-15 RX ADMIN — SODIUM CHLORIDE 100 ML/HR: 9 INJECTION, SOLUTION INTRAVENOUS at 06:20

## 2019-11-15 RX ADMIN — SODIUM CHLORIDE 100 ML/HR: 9 INJECTION, SOLUTION INTRAVENOUS at 14:35

## 2019-11-15 NOTE — PLAN OF CARE
Problem: Patient Care Overview  Goal: Plan of Care Review  Outcome: Ongoing (interventions implemented as appropriate)      Problem: Cardiac: ACS (Acute Coronary Syndrome) (Adult)  Goal: Signs and Symptoms of Listed Potential Problems Will be Absent, Minimized or Managed (Cardiac: ACS)  Outcome: Ongoing (interventions implemented as appropriate)   11/15/19 0515   Goal/Outcome Evaluation   Problems Assessed (Acute Coronary Syndrome) all   Problems Present (Acute Coronary Syn) chest pain (angina)       Problem: Pain, Acute (Adult)  Goal: Identify Related Risk Factors and Signs and Symptoms  Outcome: Outcome(s) achieved Date Met: 11/15/19    Goal: Acceptable Pain Control/Comfort Level  Outcome: Ongoing (interventions implemented as appropriate)

## 2019-11-15 NOTE — PROGRESS NOTES
[facility] Cardiology  INPATIENT PROGRESS NOTE    Name: Alex Miranda  Age/Sex: 44 y.o. male  :  1975        PCP: Provider, No Known    Vital Signs  Temp:  [97.6 °F (36.4 °C)-98.8 °F (37.1 °C)] 97.9 °F (36.6 °C)  Heart Rate:  [0-111] 0  Resp:  [14-20] 19  BP: (130-161)/() 143/91  Body mass index is 33.44 kg/m².     Subjective -doing well. For egd today.    Patient Active Problem List   Diagnosis   • Hypertension   • ADHD (attention deficit hyperactivity disorder)   • Erythrocytosis   • Snoring   • Chest pain at rest   • STEMI (ST elevation myocardial infarction) (CMS/[facility])   • Gastroesophageal reflux disease       Past Medical History:   Diagnosis Date   • ADHD (attention deficit hyperactivity disorder)    • Hypertension        Current Facility-Administered Medications   Medication Dose Route Frequency Provider Last Rate Last Dose   • acetaminophen (TYLENOL) tablet 650 mg  650 mg Oral Q4H PRN Dariel Mims MD       • ALPRAZolam (XANAX) tablet 0.5 mg  0.5 mg Oral TID PRN Dariel Mims MD   0.5 mg at 19 193   • aspirin EC tablet 81 mg  81 mg Oral Daily Dariel Mims MD   81 mg at 11/15/19 0925   • diphenhydrAMINE (BENADRYL) injection 25 mg  25 mg Intravenous Q6H PRN Dariel Mims MD       • docusate sodium (COLACE) capsule 100 mg  100 mg Oral Daily Dariel Mims MD   100 mg at 11/15/19 0926   • famotidine (PEPCID) tablet 20 mg  20 mg Oral Daily Dariel Mims MD   20 mg at 11/15/19 0911   • hydroCHLOROthiazide (MICROZIDE) capsule 12.5 mg  12.5 mg Oral Daily Dariel Mims MD   12.5 mg at 11/15/19 0911   • HYDROcodone-acetaminophen (NORCO) 5-325 MG per tablet 1 tablet  1 tablet Oral Q4H PRN Dariel Mims MD       • lisinopril (PRINIVIL,ZESTRIL) tablet 20 mg  20 mg Oral Q24H Dariel Mims MD   20 mg at 11/15/19 0911   • metoprolol succinate XL (TOPROL-XL) 24 hr tablet 50 mg  50 mg Oral Q24H Dariel Mims  MD Ramirez   50 mg at 11/15/19 0911   • naloxone (NARCAN) injection 0.4 mg  0.4 mg Intravenous Q5 Min PRN Dariel Mims MD       • ondansetron (ZOFRAN) tablet 4 mg  4 mg Oral Q6H PRN Dariel Mims MD        Or   • ondansetron (ZOFRAN) injection 4 mg  4 mg Intravenous Q6H PRN Dariel Mims MD   4 mg at 11/14/19 1002   • potassium chloride (KLOR-CON) packet 40 mEq  40 mEq Oral PRN Dariel Mims MD       • potassium chloride (MICRO-K) CR capsule 40 mEq  40 mEq Oral PRN Dariel Mims MD   40 mEq at 11/14/19 1802   • sodium chloride 0.9 % infusion  100 mL/hr Intravenous Continuous Dariel Mims  mL/hr at 11/15/19 0620     • sodium chloride 0.9 % infusion  100 mL/hr Intravenous Continuous Dariel Mims  mL/hr at 11/15/19 1435 100 mL/hr at 11/15/19 1435   • temazepam (RESTORIL) capsule 15 mg  15 mg Oral Nightly PRN Dariel Mims MD           Past Surgical History:   Procedure Laterality Date   • AMPUTATION         Social History     Socioeconomic History   • Marital status:      Spouse name: Not on file   • Number of children: Not on file   • Years of education: Not on file   • Highest education level: Not on file   Tobacco Use   • Smoking status: Former Smoker     Packs/day: 1.00   • Smokeless tobacco: Former User   Substance and Sexual Activity   • Alcohol use: Yes       O/E    Neck: Supple.  No JVD, no thyroid enlargement.  Chest: Air entry equal, normal respiration.  No rhonchi or creps.  Cardiovascular system:  Regular rate and rhythm, no murmurs.  Abdomen: Soft, no tenderness, bowel sounds present, no hepatosplenomegaly.  CNS: Alert, oriented to place and time.  No motor or sensory deficit.  Cranial nerves intact.  Musculoskeletal: No deformity of the back or spine.  Extremities:  No edema.  Pulses equal on both sides.    Lab Results (last 24 hours)     Procedure Component Value Units Date/Time    Troponin [[id number]]  (Normal) Collected:   11/14/19 2037    Specimen:  Blood Updated:  11/14/19 2109     Troponin T <0.010 ng/mL     Narrative:       Troponin T Reference Range:  <= 0.03 ng/mL-   Negative for AMI  >0.03 ng/mL-     Abnormal for myocardial necrosis.  Clinicians would have to utilize clinical acumen, EKG, Troponin and serial changes to determine if it is an Acute Myocardial Infarction or myocardial injury due to an underlying chronic condition.     Potassium [[id number]]  (Abnormal) Collected:  11/14/19 2037    Specimen:  Blood Updated:  11/14/19 2104     Potassium 3.4 mmol/L     Potassium [[id number]]  (Normal) Collected:  11/14/19 2141    Specimen:  Blood Updated:  11/14/19 2212     Potassium 3.9 mmol/L     Potassium [[id number]]  (Normal) Collected:  11/14/19 2259    Specimen:  Blood Updated:  11/14/19 2323     Potassium 3.8 mmol/L     Troponin [[id number]]  (Normal) Collected:  11/15/19 0220    Specimen:  Blood Updated:  11/15/19 0309     Troponin T <0.010 ng/mL     Narrative:       Troponin T Reference Range:  <= 0.03 ng/mL-   Negative for AMI  >0.03 ng/mL-     Abnormal for myocardial necrosis.  Clinicians would have to utilize clinical acumen, EKG, Troponin and serial changes to determine if it is an Acute Myocardial Infarction or myocardial injury due to an underlying chronic condition.     CBC (No Diff) [[id number]]  (Abnormal) Collected:  11/15/19 0220    Specimen:  Blood Updated:  11/15/19 0255     WBC 9.43 10*3/mm3      RBC 6.90 10*6/mm3      Hemoglobin 16.9 g/dL      Hematocrit 53.1 %      MCV 77.0 fL      MCH 24.5 pg      MCHC 31.8 g/dL      RDW 20.4 %      RDW-SD 51.1 fl      MPV 10.6 fL      Platelets 181 10*3/mm3     Basic Metabolic Panel [[id number]]  (Normal) Collected:  11/15/19 0220    Specimen:  Blood Updated:  11/15/19 0410     Glucose 87 mg/dL      BUN 8 mg/dL      Creatinine 0.88 mg/dL      Sodium 136 mmol/L      Potassium 4.0 mmol/L      Chloride 101 mmol/L      CO2 22.0 mmol/L      Calcium 8.6 mg/dL      eGFR Non African  Amer 94 mL/min/1.73      BUN/Creatinine Ratio 9.1     Anion Gap 13.0 mmol/L     Narrative:       GFR Normal >60  Chronic Kidney Disease <60  Kidney Failure <15    Hemoglobin A1c [[id number]]  (Normal) Collected:  11/15/19 0220    Specimen:  Blood Updated:  11/15/19 0327     Hemoglobin A1C 5.60 %     Narrative:       Hemoglobin A1C Ranges:    Increased Risk for Diabetes  5.7% to 6.4%  Diabetes                     >= 6.5%  Diabetic Goal                < 7.0%    Lipid Panel [[id number]] Collected:  11/15/19 0220    Specimen:  Blood Updated:  11/15/19 0309     Total Cholesterol 118 mg/dL      Triglycerides 83 mg/dL      HDL Cholesterol 44 mg/dL      LDL Cholesterol  57 mg/dL      VLDL Cholesterol 16.6 mg/dL      LDL/HDL Ratio 1.30    Narrative:       Cholesterol Reference Ranges  (U.S. Department of Health and Human Services ATP III Classifications)    Desirable          <200 mg/dL  Borderline High    200-239 mg/dL  High Risk          >240 mg/dL      Triglyceride Reference Ranges  (U.S. Department of Health and Human Services ATP III Classifications)    Normal           <150 mg/dL  Borderline High  150-199 mg/dL  High             200-499 mg/dL  Very High        >500 mg/dL    HDL Reference Ranges  (U.S. Department of Health and Human Services ATP III Classifcations)    Low     <40 mg/dl (major risk factor for CHD)  High    >60 mg/dl ('negative' risk factor for CHD)        LDL Reference Ranges  (U.S. Department of Health and Human Services ATP III Classifcations)    Optimal          <100 mg/dL  Near Optimal     100-129 mg/dL  Borderline High  130-159 mg/dL  High             160-189 mg/dL  Very High        >189 mg/dL    Urine Drug Screen - Urine, Clean Catch [[id number]]  (Abnormal) Collected:  11/15/19 0858    Specimen:  Urine, Clean Catch Updated:  11/15/19 0911     THC, Screen, Urine Negative     Phencyclidine (PCP), Urine Negative     Cocaine Screen, Urine Negative     Methamphetamine, Ur Negative     Opiate Screen  Positive     Amphetamine Screen, Urine Positive     Benzodiazepine Screen, Urine Positive     Tricyclic Antidepressants Screen Negative     Methadone Screen, Urine Negative     Barbiturates Screen, Urine Negative     Oxycodone Screen, Urine Negative     Propoxyphene Screen Negative     Buprenorphine, Screen, Urine Negative    Narrative:       Cutoff For Drugs Screened:    Amphetamines               500 ng/ml  Barbiturates               200 ng/ml  Benzodiazepines            150 ng/ml  Cocaine                    150 ng/ml  Methadone                  200 ng/ml  Opiates                    100 ng/ml  Phencyclidine               25 ng/ml  THC                            50 ng/ml  Methamphetamine            500 ng/ml  Tricyclic Antidepressants  300 ng/ml  Oxycodone                  100 ng/ml  Propoxyphene               300 ng/ml  Buprenorphine               10 ng/ml    The normal value for all drugs tested is negative. This report includes unconfirmed screening results, with the cutoff values listed, to be used for medical treatment purposes only.  Unconfirmed results must not be used for non-medical purposes such as employment or legal testing.  Clinical consideration should be applied to any drug of abuse test, particularly when unconfirmed results are used.            A/P  HTN- will adjust Lisinopril and add BBLOCKER.    For EGD today.  Home in am if ok with all        This document has been electronically signed by Dariel Mims MD on November 15, 2019 4:54 PM         Part of this note may be an electronic transcription/translation of spoken language to printed text using the Dragon Dictation System.

## 2019-11-15 NOTE — ANESTHESIA PREPROCEDURE EVALUATION
Anesthesia Evaluation     Patient summary reviewed and Nursing notes reviewed   NPO Solid Status: > 8 hours  NPO Liquid Status: > 8 hours           Airway   Mallampati: II  TM distance: >3 FB  Neck ROM: full  No difficulty expected  Dental - normal exam     Pulmonary - normal exam   Cardiovascular - normal exam    (+) hypertension,       Neuro/Psych  (+) psychiatric history ADHD,     GI/Hepatic/Renal/Endo    (+)  GERD,      Musculoskeletal     Abdominal  - normal exam   Substance History      OB/GYN          Other                        Anesthesia Plan    ASA 2     MAC     intravenous induction     Anesthetic plan, all risks, benefits, and alternatives have been provided, discussed and informed consent has been obtained with: patient.

## 2019-11-15 NOTE — ANESTHESIA POSTPROCEDURE EVALUATION
Patient: Alex Miranda    Procedure Summary     Date:  11/15/19 Room / Location:  [facility] ENDOSCOPY 1 / [facility] ENDOSCOPY    Anesthesia Start:  1513 Anesthesia Stop:  1538    Procedure:  ESOPHAGOGASTRODUODENOSCOPY (N/A ) Diagnosis:       Gastroesophageal reflux disease, esophagitis presence not specified      Chest pain at rest      (Gastroesophageal reflux disease, esophagitis presence not specified [K21.9])      (Chest pain at rest [R07.9])    Surgeon:  Mick Segura MD Provider:  Tim Snyder CRNA    Anesthesia Type:  MAC ASA Status:  2          Anesthesia Type: MAC  Last vitals  BP   146/88 (11/15/19 1431)   Temp   97.9 °F (36.6 °C) (11/15/19 1431)   Pulse   83 (11/15/19 1431)   Resp   14 (11/15/19 1431)     SpO2   97 % (11/15/19 1431)     Post Anesthesia Care and Evaluation    Patient location during evaluation: bedside  Patient participation: complete - patient participated  Level of consciousness: awake  Pain score: 0  Pain management: adequate  Airway patency: patent  Anesthetic complications: No anesthetic complications  PONV Status: none  Cardiovascular status: acceptable  Respiratory status: acceptable  Hydration status: acceptable

## 2019-11-16 ENCOUNTER — READMISSION MANAGEMENT (OUTPATIENT)
Dept: CALL CENTER | Facility: HOSPITAL | Age: 44
End: 2019-11-16

## 2019-11-16 NOTE — DISCHARGE SUMMARY
Date of admission: November 14, 2019  Date of discharge: November 15, 2019      Diagnosis at the time of discharge:  1.  Chest pain syndrome, probably multifactorial secondary to obstructive sleep apnea, acid reflux, as well as left ventricular hypertrophy with hypertension  2.  Esophagitis without bleeding  3.  Hiatal hernia  4.  Gastritis without bleeding  5.  Left ventricular hypertrophy with strain  6.  Obstructive sleep apnea, witnessed by nursing staff  7.  Minimal coronary artery disease by angiography  8.  Polycythemia secondary to obstructive sleep apnea    Discharge medication  1.  Toprol XL 25 mg daily  2.  Prinivil 20/12.5 mg 1 p.o. daily  3.  Protonix 40 mg daily  4.  Aspirin 81 mg p.o. daily    Procedures:  1.  Left heart catheterization by Dr. Mims  2.  Esophagogastroduodenoscopy with biopsy by Dr. Segura  3.  CT angiography of thorax and upper abdomen    Diet: Cardiac prudent diet    Activity: Ad robert.    Follow-up:  1.  Dr. Mims  in 1 week to recheck blood pressure  2.  Dr. Segura in 1 month  3.  Sleep study as outpatient.  Has been set up with discharge.      Condition at discharge: Patient is without chest pain without evidence of any ongoing myocardial ischemia    Hospital course: Patient was admitted from walk-in clinic where there was concern regarding the possibility of acute myocardial infarction with anterior wall myocardial infarction.  Dr. Valadez notes that the ECG on his examination showed inferior wall elevations accompanied with constant chest pain.  The patient had preprocedure lab testing that shows the patient to have polycythemia.  Review of the patient's chart shows that patient has no evidence of a Hardeep 2 mutation.  The patient was taken immediately to the heart catheterization lab where Dr. Mims says that there was minimal coronary artery disease that was identified.  The patient had a CT arteriogram that shows the patient to have no evidence of any thoracic aneurysm or  dissection.  There is no evidence of any acute intra-abdominal process either.  The gallbladder had no evidence of any stone disease or inflammatory changes.  Hiatal hernia was noted.  Gastroenterology consultation was obtained with Dr. Segura.  EGD was performed that showed a hiatal hernia with esophagitis, grade 2 with gastritis that was identified.  There was a hiatal hernia that was felt to be a sliding type of hiatal hernia without evidence of any incarceration.  Patient was started on proton pump inhibitor inhibitors.    The nurses noted the patient to have evident evidence of intermittent obstructive sleep apnea that was noted.  It was encouraged with the patient to have a sleep study performed as an outpatient and that has been set up.    I was called in the absence of Dr. Segura.  The family was concerned regarding the findings and what need to be done.  I coordinated the patient's care with Dr. Mims.  The patient is being discharged from the intensive care unit to home.  They are comfortable with the discharge and they want to go home as soon as possible.    I have reviewed with them in detail to include going over the images and the recommendations to the best of my ability.

## 2019-11-16 NOTE — OUTREACH NOTE
Prep Survey      Responses   Facility patient discharged from?  [facility]   Is patient eligible?  Yes   Discharge diagnosis  STEMI, s/p cardiac cath., s/p upper GI endoscopy   Does the patient have one of the following disease processes/diagnoses(primary or secondary)?  Acute MI (STEMI,NSTEMI)   Does the patient have Home health ordered?  No   Is there a DME ordered?  No   Comments regarding appointments  Pt to schedule follow up appointments   Prep survey completed?  Yes          Ellen Dow RN

## 2019-11-18 NOTE — PAYOR COMM NOTE
"Jenifer Jones  [facility]  (P)[phone]  (F)[phone]    Ref#[id number]          Juan Jose Her (44 y.o. Male)     Date of Birth Social Security Number Address [address] Phone MRN    1975  [address] [facility] [phone] [id number]    Yazdanism Marital Status          None        Admission Date Admission Type Admitting Provider Attending Provider Department, Room/Bed    11/14/19 Urgent Dariel Mims MD  [facility] CRITICAL CARE, 07/A    Discharge Date Discharge Disposition Discharge Destination        11/15/2019 Home or Self Care              Attending Provider:  (none)   Allergies:  Penicillins    Isolation:  None   Infection:  None   Code Status:  Prior    Ht:  193 cm (76\")   Wt:  125 kg (274 lb 11.1 oz)    Admission Cmt:  None   Principal Problem:  None                Active Insurance as of 11/14/2019     Primary Coverage     Payor Plan Insurance Group Employer/Plan Group    [employer]CIAL A[employer] [id number]     Payor Plan Address Payor Plan Phone Number Payor Plan Fax Number Effective Dates    [address] [address]   1/2/2019 - None Entered    [place] [id number]       Subscriber Name Subscriber Birth Date Member ID       JUAN JOSE HER 1975 [id number]                 Emergency Contacts      (Rel.) Home Phone Work Phone Mobile Phone    Sergey Her (Spouse) [phone] -- [phone]               Discharge Summary      Ruperto Hidalgo DO at 11/15/19 1912        Date of admission: November 14, 2019  Date of discharge: November 15, 2019      Diagnosis at the time of discharge:  1.  Chest pain syndrome, probably multifactorial secondary to obstructive sleep apnea, acid reflux, as well as left ventricular hypertrophy with hypertension  2.  Esophagitis without bleeding  3.  Hiatal hernia  4.  Gastritis without bleeding  5.  Left ventricular hypertrophy with strain  6.  Obstructive sleep " apnea, witnessed by nursing staff  7.  Minimal coronary artery disease by angiography  8.  Polycythemia secondary to obstructive sleep apnea    Discharge medication  1.  Toprol XL 25 mg daily  2.  Prinivil 20/12.5 mg 1 p.o. daily  3.  Protonix 40 mg daily  4.  Aspirin 81 mg p.o. daily    Procedures:  1.  Left heart catheterization by Dr. Mims  2.  Esophagogastroduodenoscopy with biopsy by Dr. Segura  3.  CT angiography of thorax and upper abdomen    Diet: Cardiac prudent diet    Activity: Ad robert.    Follow-up:  1.  Dr. Mims  in 1 week to recheck blood pressure  2.  Dr. Segura in 1 month  3.  Sleep study as outpatient.  Has been set up with discharge.      Condition at discharge: Patient is without chest pain without evidence of any ongoing myocardial ischemia    Hospital course: Patient was admitted from walk-in clinic where there was concern regarding the possibility of acute myocardial infarction with anterior wall myocardial infarction.  Dr. Valadez notes that the ECG on his examination showed inferior wall elevations accompanied with constant chest pain.  The patient had preprocedure lab testing that shows the patient to have polycythemia.  Review of the patient's chart shows that patient has no evidence of a Hardeep 2 mutation.  The patient was taken immediately to the heart catheterization lab where Dr. Mims says that there was minimal coronary artery disease that was identified.  The patient had a CT arteriogram that shows the patient to have no evidence of any thoracic aneurysm or dissection.  There is no evidence of any acute intra-abdominal process either.  The gallbladder had no evidence of any stone disease or inflammatory changes.  Hiatal hernia was noted.  Gastroenterology consultation was obtained with Dr. Segura.  EGD was performed that showed a hiatal hernia with esophagitis, grade 2 with gastritis that was identified.  There was a hiatal hernia that was felt to be a sliding type of hiatal hernia  without evidence of any incarceration.  Patient was started on proton pump inhibitor inhibitors.    The nurses noted the patient to have evident evidence of intermittent obstructive sleep apnea that was noted.  It was encouraged with the patient to have a sleep study performed as an outpatient and that has been set up.    I was called in the absence of Dr. Segura.  The family was concerned regarding the findings and what need to be done.  I coordinated the patient's care with Dr. Mims.  The patient is being discharged from the intensive care unit to home.  They are comfortable with the discharge and they want to go home as soon as possible.    I have reviewed with them in detail to include going over the images and the recommendations to the best of my ability.    Electronically signed by Inocencia Martinez, DO at 11/15/19 1924       Discharge Order (From admission, onward)    Start     Ordered    11/15/19 1914  Discharge patient  Once     Expected Discharge Date:  11/15/19    Discharge Disposition:  Home or Self Care    Physician of Record for Attribution - Please select from Treatment Team:  INOCENCIA MARTINEZ [[id number]]    Review needed by CMO to determine Physician of Record:  No       Question Answer Comment   Physician of Record for Attribution - Please select from Treatment Team INOCENCIA MARTINEZ    Review needed by CMO to determine Physician of Record No        11/15/19 1914

## 2019-11-19 ENCOUNTER — READMISSION MANAGEMENT (OUTPATIENT)
Dept: CALL CENTER | Facility: HOSPITAL | Age: 44
End: 2019-11-19

## 2019-11-19 LAB
LAB AP CASE REPORT: NORMAL
PATH REPORT.FINAL DX SPEC: NORMAL
PATH REPORT.GROSS SPEC: NORMAL

## 2019-11-19 NOTE — OUTREACH NOTE
Case Mgmt/Unit Call Center Follow-up      Responses   [facility] Call Center Tracking Reason?  Scheduling of follow up appointments   Has the Call Center Case Management Follow-up issue been resolved?  Yes   Follow-up Comments  Zaria Vences RNCM, contacted the family residency dept and requested that they follow up with him to schedule an appointment. She also contacted the patient and advised him of the phone number to call and schedule his sleep study and to let him know that no follow up was needed with Dr. Mims.          Jenifer Jones    11/19/2019, 9:45 AM

## 2019-11-19 NOTE — OUTREACH NOTE
AMI Week 1 Survey      Responses   Facility patient discharged from?  [facility]   Does the patient have one of the following disease processes/diagnoses(primary or secondary)?  Acute MI (STEMI,NSTEMI)   Is there a successful TCM telephone encounter documented?  No   Week 1 attempt successful?  Yes   Call start time  0902   Call end time  0906   Discharge diagnosis  STEMI, s/p cardiac cath., s/p upper GI endoscopy   Is patient permission given to speak with other caregiver?  No   Meds reviewed with patient/caregiver?  Yes   Is the patient having any side effects they believe may be caused by any medication additions or changes?  No   Does the patient have all prescriptions related to this admission filled (includes statins,anticoagulants,HTN meds,anti-arrhythmia meds)  Yes   Is the patient taking all medications as directed (includes completed medication regime)?  Yes   Medication comments  Patient states that he did get meds listed in discharge summary: toprol, prinivil, protonix, aspirin. Not listed on AVS   Does the patient have a primary care provider?   Yes   Does the patient have an appointment with their PCP,cardiologist,or clinic within 7 days of discharge?  No   Comments regarding PCP  Advised patient to make one week appt with PCP   Nursing Interventions  Educated patient on importance of making appointment, Advised patient to make appointment   Has the patient kept scheduled appointments due by today?  N/A   Notified Case Management  Appointments   Comments  Patient states that he was supposed to have a sleep study scheduled, but has not heard anything. Also should have one week f/u with Dr Mims. No appts made.    Has home health visited the patient within 72 hours of discharge?  N/A   Psychosocial issues?  No   Did the patient receive a copy of their discharge instructions?  Yes   Nursing interventions  Reviewed instructions with patient   What is the patient's perception of their health status since  discharge?  Improving   Nursing interventions  Nurse provided patient education   Is the patient/caregiver able to teach back signs and symptoms of when to call for help immediately:  Sudden chest discomfort, Sudden discomfort in arms, back, neck or jaw, Shortness of breath at any time, Sudden sweating or clammy skin, Nausea or vomiting, Dizziness or lightheadedness, Irregular or rapid heart rate   Is the patient/caregiver able to teach back ways to prevent a second heart attack:  Take medications, Follow up with MD   Is the patient/caregiver able to teach back the hierarchy of who to call/visit for symptoms/problems? PCP, Specialist, Home health nurse, Urgent Care, ED, 911  Yes   Week 1 call completed?  Yes          Ellen Jasso RN

## 2019-11-20 LAB
AMPHETAMINES UR QL SCN: POSITIVE
BARBITURATES UR QL SCN: NEGATIVE NG/ML
BENZODIAZ UR QL SCN: NEGATIVE
BZE UR QL SCN: NEGATIVE NG/ML
CANNABINOIDS UR QL SCN: NEGATIVE NG/ML
METHADONE UR QL SCN: NEGATIVE NG/ML
OPIATES TESTED UR SCN: NEGATIVE NG/ML
PCP UR QL: NEGATIVE NG/ML
PROPOXYPH UR QL SCN: NEGATIVE NG/ML

## 2019-11-26 ENCOUNTER — CONSULT (OUTPATIENT)
Dept: SLEEP MEDICINE | Facility: HOSPITAL | Age: 44
End: 2019-11-26

## 2019-11-26 ENCOUNTER — READMISSION MANAGEMENT (OUTPATIENT)
Dept: CALL CENTER | Facility: HOSPITAL | Age: 44
End: 2019-11-26

## 2019-11-26 VITALS
SYSTOLIC BLOOD PRESSURE: 138 MMHG | DIASTOLIC BLOOD PRESSURE: 96 MMHG | HEIGHT: 76 IN | BODY MASS INDEX: 32.79 KG/M2 | OXYGEN SATURATION: 98 % | HEART RATE: 92 BPM | WEIGHT: 269.3 LBS

## 2019-11-26 DIAGNOSIS — G47.33 OBSTRUCTIVE SLEEP APNEA, ADULT: Primary | ICD-10-CM

## 2019-11-26 PROCEDURE — 99213 OFFICE O/P EST LOW 20 MIN: CPT | Performed by: INTERNAL MEDICINE

## 2019-11-26 RX ORDER — LISINOPRIL AND HYDROCHLOROTHIAZIDE 20; 12.5 MG/1; MG/1
TABLET ORAL
COMMUNITY
Start: 2019-11-15

## 2019-11-26 RX ORDER — PANTOPRAZOLE SODIUM 40 MG/1
TABLET, DELAYED RELEASE ORAL
COMMUNITY
Start: 2019-11-15

## 2019-11-26 RX ORDER — METOPROLOL SUCCINATE 25 MG/1
TABLET, EXTENDED RELEASE ORAL
COMMUNITY
Start: 2019-11-15

## 2019-11-26 NOTE — OUTREACH NOTE
AMI Week 2 Survey      Responses   Facility patient discharged from?  [facility]   Does the patient have one of the following disease processes/diagnoses(primary or secondary)?  Acute MI (STEMI,NSTEMI)   Week 2 attempt successful?  No   Unsuccessful attempts  Attempt 1          Casandra Saravia RN

## 2019-11-26 NOTE — PROGRESS NOTES
0Sleep Clinic Follow Up    Date: 2019  Primary Care Physician: Provider, No Known    Last office visit: 2018 (I reviewed this note)    CC: Follow up: [facility]    Sleep Testin. Not done after last visit.    Assessment and Plan:    1. Obstructive sleep apnea undiagnosed new problem with uncertain prognosis  and New (to me), additional work-up planned (4)  1. HST  2. RTC in 3 months but call with results.  2.  Attention deficit hyperactivity disorder -stable chronic illness, no further work-up planned at this time  3. Hypertension same as above     Interim History:  Since the last visit:    1)  Alex Miranda has continue with the same symptoms as his 2018 visit.  Unfortunately he never had his sleep study done.  His daytime sleepiness and nonrestorative sleep apnea assisted and worsened to some degree.  He is not instead in pursuing testing and therapy.  His sleep routine is unchanged since his last office visit.    PMHx, FH, SH reviewed and pertinent changes are:  unchanged from last office visit (date above)      REVIEW OF SYSTEMS:   Negative for chest pain, fever, cough, SOA, abdominal pain.   Exam:  Vitals:    19 1004   BP: 138/96   Pulse: 92   SpO2: 98%           19  1004   Weight: 122 kg (269 lb 4.8 oz)     Body mass index is 32.79 kg/m². Patient's Body mass index is 32.79 kg/m². BMI is above normal parameters. Recommendations include: referral to primary care.      Gen:  No acute distress, alert, oriented  Lungs:  CTA with normal effort   CV:  RRR, no M/R/G  GI:  soft, non-tender  Psych:  Appropriate affect    Past Medical History:   Diagnosis Date   • ADHD (attention deficit hyperactivity disorder)    • Hypertension        Current Outpatient Medications:   •  lisinopril-hydrochlorothiazide (PRINZIDE,ZESTORETIC) 20-12.5 MG per tablet, , Disp: , Rfl:   •  metoprolol succinate XL (TOPROL-XL) 25 MG 24 hr tablet, , Disp: , Rfl:   •  pantoprazole (PROTONIX) 40 MG EC  tablet, , Disp: , Rfl:     Total visit time 15 min, with total of 10 minutes spent face-to-face counseling patient extensively regarding:   Need to complete sleep testing especially since he has hypertension      RTC in 3 months     This document has been electronically signed by Vishal Borrego MD on November 26, 2019         CC: Provider, No Known          Vishal Santiago MD

## 2019-11-27 ENCOUNTER — READMISSION MANAGEMENT (OUTPATIENT)
Dept: CALL CENTER | Facility: HOSPITAL | Age: 44
End: 2019-11-27

## 2019-11-27 ENCOUNTER — OFFICE VISIT (OUTPATIENT)
Dept: FAMILY MEDICINE CLINIC | Facility: CLINIC | Age: 44
End: 2019-11-27

## 2019-11-27 VITALS
DIASTOLIC BLOOD PRESSURE: 82 MMHG | TEMPERATURE: 97.1 F | HEART RATE: 116 BPM | WEIGHT: 267.1 LBS | OXYGEN SATURATION: 98 % | HEIGHT: 75 IN | BODY MASS INDEX: 33.21 KG/M2 | SYSTOLIC BLOOD PRESSURE: 124 MMHG

## 2019-11-27 DIAGNOSIS — Z09 HOSPITAL DISCHARGE FOLLOW-UP: Primary | ICD-10-CM

## 2019-11-27 DIAGNOSIS — R07.9 CHEST PAIN, UNSPECIFIED TYPE: ICD-10-CM

## 2019-11-27 DIAGNOSIS — K21.00 GASTROESOPHAGEAL REFLUX DISEASE WITH ESOPHAGITIS: ICD-10-CM

## 2019-11-27 PROCEDURE — 99203 OFFICE O/P NEW LOW 30 MIN: CPT | Performed by: STUDENT IN AN ORGANIZED HEALTH CARE EDUCATION/TRAINING PROGRAM

## 2019-11-27 NOTE — PROGRESS NOTES
ID: Alex Miranda    CC:   Chief Complaint   Patient presents with   • STEMI     hospital follow up   • Heartburn       Subjective:     Alex Miranda is a 44 y.o. male who presents for:    HPI   Patient presents for hospital follow-up he was admitted 11/14-11/15 for chest pain.  He underwent coronary cath and an endoscopy.  Cardiac cath was unremarkable.He was found to have esophagitis and a hiatal hernia. He was found to have esophagitis and a hiatal hernia with heartburn.  Patient was started on Protonix, metoprolol, lisinopril, HCTZ.  He has no worries, concerns, complaints that he specifically wants to discuss today.  He does not need refills.    Past Medical Hx:  Past Medical History:   Diagnosis Date   • ADHD (attention deficit hyperactivity disorder)    • Hypertension        Past Surgical Hx:  Past Surgical History:   Procedure Laterality Date   • AMPUTATION     • CARDIAC CATHETERIZATION N/A 11/14/2019    Procedure: Left Heart Cath;  Surgeon: Dariel Mims MD;  Location: [facility] CATH INVASIVE LOCATION;  Service: Cardiology   • ENDOSCOPY N/A 11/15/2019    Procedure: ESOPHAGOGASTRODUODENOSCOPY;  Surgeon: Mick Segura MD;  Location: [facility] ENDOSCOPY;  Service: Gastroenterology       Health Maintenance:  Health Maintenance   Topic Date Due   • ANNUAL PHYSICAL  10/12/1978   • TDAP/TD VACCINES (2 - Td) 12/02/2024   • INFLUENZA VACCINE  Addressed       Current Meds:    Current Outpatient Medications:   •  lisinopril-hydrochlorothiazide (PRINZIDE,ZESTORETIC) 20-12.5 MG per tablet, , Disp: , Rfl:   •  metoprolol succinate XL (TOPROL-XL) 25 MG 24 hr tablet, , Disp: , Rfl:   •  pantoprazole (PROTONIX) 40 MG EC tablet, , Disp: , Rfl:     Allergies:  Penicillins    Family Hx:  History reviewed. No pertinent family history.     Social History:  Social History     Socioeconomic History   • Marital status:      Spouse name: Not on file   • Number of children: Not on file   • Years of  "education: Not on file   • Highest education level: Not on file   Tobacco Use   • Smoking status: Former Smoker     Packs/day: 1.00   • Smokeless tobacco: Former User   Substance and Sexual Activity   • Alcohol use: Yes   • Drug use: No       Review of Systems   Constitutional: Negative for chills, diaphoresis, fatigue and fever.   HENT: Negative for congestion and rhinorrhea.    Respiratory: Negative for cough, chest tightness, shortness of breath and wheezing.    Cardiovascular: Negative for chest pain and palpitations.   Gastrointestinal: Negative for abdominal pain, constipation, diarrhea and nausea.   Neurological: Negative for dizziness and weakness.   Psychiatric/Behavioral: Negative for agitation, confusion and sleep disturbance.         Objective:     /82 (BP Location: Left arm, Patient Position: Sitting)   Pulse 116   Temp 97.1 °F (36.2 °C) (Tympanic)   Ht 190.5 cm (75\")   Wt 121 kg (267 lb 1.6 oz)   SpO2 98%   BMI 33.39 kg/m²     Physical Exam   Constitutional: He is oriented to person, place, and time.   HENT:   Head: Normocephalic and atraumatic.   Eyes: Conjunctivae are normal.   Cardiovascular: Normal rate, regular rhythm and normal heart sounds.   Pulmonary/Chest: Effort normal and breath sounds normal. No respiratory distress.   Abdominal: Soft. Bowel sounds are normal. There is no tenderness.   Neurological: He is alert and oriented to person, place, and time. No cranial nerve deficit.   Skin: Skin is warm and dry. Capillary refill takes less than 2 seconds.   Psychiatric: He has a normal mood and affect. His behavior is normal.          Assessment/Plan:   Alex Miranda is a 44 y.o. male who was seen in clinic for:     Diagnosis Plan   1. Hospital discharge follow-up     2. Chest pain, unspecified type     3. Gastroesophageal reflux disease with esophagitis       Patient doing well on his medications.  We will follow-up in 3 months time for physical exam and recheck.  Blood " pressure controlled.     Follow-up:     Return in about 3 months (around 2/27/2020) for Annual.      Goals: take medications  Barriers to goals: pt compliance    Health Maintenance   Topic Date Due   • ANNUAL PHYSICAL  10/12/1978   • TDAP/TD VACCINES (2 - Td) 12/02/2024   • INFLUENZA VACCINE  Addressed       Tobacco: former smoker  Alcohol: occasional/rare  Lifestyle: Body mass index is 33.39 kg/m². eat more fruits and vegetables, increase water intake, reduce screen time, reduce portion size, cut out extra servings, reduce fast food intake, keep TV off during meals, plan meals, eat breakfast and have 3 meals a day    RISK SCORE: 4        This document has been electronically signed by Chris Marte MD on November 27, 2019 2:13 PM

## 2019-11-27 NOTE — OUTREACH NOTE
AMI Week 2 Survey      Responses   Facility patient discharged from?  [facility]   Does the patient have one of the following disease processes/diagnoses(primary or secondary)?  Acute MI (STEMI,NSTEMI)   Week 2 attempt successful?  No   Unsuccessful attempts  Attempt 2          Ellen Jasso RN

## 2019-11-27 NOTE — PROGRESS NOTES
I have reviewed the notes, assessments, and/or procedures performed by Chris Marte MD, I concur with her/his documentation and assessment and plan for Alex Miranda.                This document has been electronically signed by Rafael Archer MD on November 27, 2019 3:50 PM

## 2019-12-02 ENCOUNTER — READMISSION MANAGEMENT (OUTPATIENT)
Dept: CALL CENTER | Facility: HOSPITAL | Age: 44
End: 2019-12-02

## 2019-12-02 NOTE — OUTREACH NOTE
AMI Week 3 Survey      Responses   Facility patient discharged from?  [facility]   Does the patient have one of the following disease processes/diagnoses(primary or secondary)?  Acute MI (STEMI,NSTEMI)   Week 3 attempt successful?  No   Unsuccessful attempts  Attempt 2          Joan Alexander RN

## 2019-12-26 ENCOUNTER — HOSPITAL ENCOUNTER (OUTPATIENT)
Dept: SLEEP MEDICINE | Facility: HOSPITAL | Age: 44
Discharge: HOME | End: 2019-12-26
Admitting: INTERNAL MEDICINE

## 2019-12-26 DIAGNOSIS — G47.33 OBSTRUCTIVE SLEEP APNEA, ADULT: ICD-10-CM

## 2019-12-26 PROCEDURE — 95800 SLP STDY UNATTENDED: CPT

## 2019-12-26 PROCEDURE — 95800 SLP STDY UNATTENDED: CPT | Performed by: INTERNAL MEDICINE

## 2019-12-31 ENCOUNTER — TELEPHONE (OUTPATIENT)
Dept: SLEEP MEDICINE | Facility: HOSPITAL | Age: 44
End: 2019-12-31

## 2019-12-31 DIAGNOSIS — G47.33 OBSTRUCTIVE SLEEP APNEA, ADULT: Primary | ICD-10-CM

## 2019-12-31 NOTE — TELEPHONE ENCOUNTER
Patient called and was given results of Home Sleep Test.  Understood and agreed to proceed with treatment of Cpap as ordered by the physician.  Follow up appointment scheduled.

## 2020-02-26 ENCOUNTER — OFFICE VISIT (OUTPATIENT)
Dept: FAMILY MEDICINE CLINIC | Facility: CLINIC | Age: 45
End: 2020-02-26

## 2020-02-26 VITALS
SYSTOLIC BLOOD PRESSURE: 120 MMHG | HEART RATE: 93 BPM | BODY MASS INDEX: 32.63 KG/M2 | HEIGHT: 76 IN | DIASTOLIC BLOOD PRESSURE: 82 MMHG | WEIGHT: 268 LBS | OXYGEN SATURATION: 98 %

## 2020-02-26 DIAGNOSIS — I10 ESSENTIAL HYPERTENSION: Primary | ICD-10-CM

## 2020-02-26 PROCEDURE — 99212 OFFICE O/P EST SF 10 MIN: CPT | Performed by: STUDENT IN AN ORGANIZED HEALTH CARE EDUCATION/TRAINING PROGRAM

## 2020-02-26 NOTE — PROGRESS NOTES
I have reviewed the notes, assessments, and/or procedures performed by Chris Marte MD during office visit. I concur with her/his documentation and assessment and plan for Alex Miranda.      This document has been electronically signed by Jeanmarie Delaney MD on February 26, 2020 10:31 AM

## 2020-02-26 NOTE — PROGRESS NOTES
ID: Alex Miranda    CC:   Chief Complaint   Patient presents with   • Hypertension     3 MONTH RECHK        Subjective:     Alex Miranda is a 44 y.o. male who presents for:    HPI   Patient presents for follow-up of his hypertension.  Patient is taking his medications.  No side effects.  He measures his blood pressure at home and reports is mainly in the 120s over 70s to 80s.  Patient has been taking his medication as prescribed.    Past Medical Hx:  Past Medical History:   Diagnosis Date   • ADHD (attention deficit hyperactivity disorder)    • Hypertension        Past Surgical Hx:  Past Surgical History:   Procedure Laterality Date   • AMPUTATION     • CARDIAC CATHETERIZATION N/A 11/14/2019    Procedure: Left Heart Cath;  Surgeon: Dariel Mims MD;  Location[facility] CATH [facility]VASIVE LOC[facility];  Service: Cardiology   • ENDOSCOPY N/A 11/15/2019    Procedure: ESOPHAGOGASTRODUODENOSCOPY;  Surgeon: Mick Segura MD;  Location: Health system ENDOSCOPY;  Service: Gastroenterology       Health Maintenance:  Health Maintenance   Topic Date Due   • ANNUAL PHYSICAL  10/12/1978   • TDAP/TD VACCINES (2 - Td) 12/02/2024   • INFLUENZA VACCINE  Addressed       Current Meds:    Current Outpatient Medications:   •  lisinopril-hydrochlorothiazide (PRINZIDE,ZESTORETIC) 20-12.5 MG per tablet, , Disp: , Rfl:   •  metoprolol succinate XL (TOPROL-XL) 25 MG 24 hr tablet, , Disp: , Rfl:   •  pantoprazole (PROTONIX) 40 MG EC tablet, , Disp: , Rfl:     Allergies:  Penicillins    Family Hx:  No family history on file.     Social History:  Social History     Socioeconomic History   • Marital status:      Spouse name: Not on file   • Number of children: Not on file   • Years of education: Not on file   • Highest education level: Not on file   Tobacco Use   • Smoking status: Former Smoker     Packs/day: 1.00   • Smokeless tobacco: Former User   Substance and Sexual Activity   • Alcohol use: Yes   • Drug use: No  "      Review of Systems   Constitutional: Negative for chills, diaphoresis, fatigue and fever.   HENT: Negative for congestion and rhinorrhea.    Eyes: Negative for visual disturbance.   Respiratory: Negative for cough, shortness of breath and wheezing.    Cardiovascular: Negative for chest pain, palpitations and leg swelling.   Gastrointestinal: Negative for abdominal pain, diarrhea and nausea.   Genitourinary: Negative for dysuria and flank pain.   Skin: Negative for color change and rash.   Neurological: Negative for dizziness, light-headedness and headaches.   Psychiatric/Behavioral: Negative for agitation. The patient is not nervous/anxious.          Objective:     /82   Pulse 93   Ht 193 cm (76\")   Wt 122 kg (268 lb)   SpO2 98%   BMI 32.62 kg/m²     Physical Exam   Constitutional: He is oriented to person, place, and time. He appears well-developed and well-nourished. He is active.  Non-toxic appearance. He does not have a sickly appearance. He does not appear ill. No distress.   HENT:   Head: Normocephalic.   Right Ear: External ear normal.   Left Ear: External ear normal.   Mouth/Throat: Mucous membranes are normal.   Eyes: Conjunctivae are normal.   Cardiovascular: Normal rate.   Pulmonary/Chest: Effort normal. No accessory muscle usage. No respiratory distress. He has no decreased breath sounds. He has no wheezes. He exhibits no tenderness.   Abdominal: Soft. Bowel sounds are normal. There is no tenderness (deep palpation). There is no rigidity.   Neurological: He is alert and oriented to person, place, and time. He is not disoriented. No cranial nerve deficit (grossly intact). GCS eye subscore is 4. GCS verbal subscore is 5. GCS motor subscore is 6.   Skin: Skin is warm and dry. Capillary refill takes less than 2 seconds. He is not diaphoretic.   Psychiatric: He has a normal mood and affect. His behavior is normal.   Nursing note and vitals reviewed.         Assessment/Plan:   Alex Lee" Ruth is a 44 y.o. male who was seen in clinic for:     Diagnosis Plan   1. Essential hypertension       Patient doing well on his medication.  He does not need refills.  Follow-up in 3 months time for his annual exam.    Follow-up:     Return in about 4 weeks (around 3/25/2020) for Annual.      Goals: take medications  Barriers to goals: pt compliance    Health Maintenance   Topic Date Due   • ANNUAL PHYSICAL  10/12/1978   • TDAP/TD VACCINES (2 - Td) 12/02/2024   • INFLUENZA VACCINE  Addressed       Tobacco: former smoker  Alcohol: occasional/rare  Lifestyle: Body mass index is 32.62 kg/m². eat more fruits and vegetables, increase water intake, reduce screen time, reduce portion size, cut out extra servings, reduce fast food intake, keep TV off during meals, plan meals, eat breakfast and have 3 meals a day    RISK SCORE: 4        This document has been electronically signed by Chris Marte MD on February 26, 2020 10:10 AM

## 2021-03-03 ENCOUNTER — TELEPHONE (OUTPATIENT)
Dept: FAMILY MEDICINE CLINIC | Facility: CLINIC | Age: 46
End: 2021-03-03

## 2021-03-03 NOTE — TELEPHONE ENCOUNTER
CALLED PATIENT TO SCHEDULE AN APT WITH OUR OFFICE FOR AN ANNUAL PHYSICAL. PATIENT STATED HE IS NOT INTERESTED AT THIS TIME.    THANKS,  RAJ

## (undated) DEVICE — CATH DIAG EXPO M/ PK 6FR FL4/FR4 PIG 3PK

## (undated) DEVICE — INTRO SHEATH ART/FEM ENGAGE .038 6F12CM

## (undated) DEVICE — CATH GUIDE LAUNCHER JL4.0 6F 100CM

## (undated) DEVICE — ELECTRODE,RT,STRESS,FOAM,50PK: Brand: MEDLINE

## (undated) DEVICE — PK CATH LAB 60

## (undated) DEVICE — SINGLE-USE BIOPSY FORCEPS: Brand: RADIAL JAW 4

## (undated) DEVICE — CATH GUIDE LAUNCHER JR4.0 6F 100CM

## (undated) DEVICE — ANGIO-SEAL EVOLUTION VASCULAR CLOSURE DEVICE: Brand: ANGIO-SEAL

## (undated) DEVICE — GW PERIPH GUIDERIGHT STD/J/TP PTFE/PCOAT SS 0.038IN 5X150CM

## (undated) DEVICE — BITEBLOCK ENDO W/STRAP 60F A/ LF DISP

## (undated) DEVICE — INTRO SHEATH ULTIMUM ACT 5F

## (undated) DEVICE — CANN SMPL SOFTECH BIFLO ETCO2 A/M 7FT

## (undated) DEVICE — GLIDESHEATH BASIC HYDROPHILIC COATED INTRODUCER SHEATH: Brand: GLIDESHEATH

## (undated) DEVICE — TREK CORONARY DILATATION CATHETER 2.50 MM X 15 MM / RAPID-EXCHANGE: Brand: TREK

## (undated) DEVICE — USE OF THE SMARTNEEDLE DEVICE IS INDICATED WHEN BLOOD FLOW MUST BE DETECTED FOR PERCUTANEOUS VESSEL CANNULATION. THE VESSEL MUST BE OF A CALIBER WHICH WOULD NORMALLY BE PUNCTURED WITH A NEEDLE AND/OR CATHETER OF THIS SIZE OR LARGER.: Brand: SMARTNEEDLE® VASCULAR ACCESS SYSTEM

## (undated) DEVICE — Device

## (undated) DEVICE — A2000 MULTI-USE SYRINGE KIT, P/N 701277-003KIT CONTENTS: 100ML CONTRAST RESERVOIR AND TUBING WITH CONTRAST SPIKE AND CLAMP: Brand: A2000 MULTI-USE SYRINGE KIT

## (undated) DEVICE — MODEL BT2000 P/N 700287-012KIT CONTENTS: MANIFOLD WITH SALINE AND CONTRAST PORTS, SALINE TUBING WITH SPIKE AND HAND SYRINGE, TRANSDUCER: Brand: BT2000 AUTOMATED MANIFOLD KIT

## (undated) DEVICE — ARTERIAL NEEDLE: Brand: UNBRANDED

## (undated) DEVICE — TREK CORONARY DILATATION CATHETER 2.50 MM X 15 MM / OVER-THE-WIRE: Brand: TREK